# Patient Record
Sex: MALE | Race: WHITE | NOT HISPANIC OR LATINO | Employment: OTHER | URBAN - METROPOLITAN AREA
[De-identification: names, ages, dates, MRNs, and addresses within clinical notes are randomized per-mention and may not be internally consistent; named-entity substitution may affect disease eponyms.]

---

## 2018-03-15 ENCOUNTER — OFFICE VISIT (OUTPATIENT)
Dept: OPHTHALMOLOGY | Facility: CLINIC | Age: 58
End: 2018-03-15
Payer: COMMERCIAL

## 2018-03-15 DIAGNOSIS — H52.03 HYPEROPIA OF BOTH EYES WITH ASTIGMATISM AND PRESBYOPIA: ICD-10-CM

## 2018-03-15 DIAGNOSIS — H52.203 HYPEROPIA OF BOTH EYES WITH ASTIGMATISM AND PRESBYOPIA: ICD-10-CM

## 2018-03-15 DIAGNOSIS — H52.4 HYPEROPIA OF BOTH EYES WITH ASTIGMATISM AND PRESBYOPIA: ICD-10-CM

## 2018-03-15 DIAGNOSIS — E11.9 TYPE 2 DIABETES MELLITUS WITHOUT RETINOPATHY: Primary | ICD-10-CM

## 2018-03-15 PROCEDURE — 92014 COMPRE OPH EXAM EST PT 1/>: CPT | Mod: S$GLB,,, | Performed by: OPTOMETRIST

## 2018-03-15 PROCEDURE — 92015 DETERMINE REFRACTIVE STATE: CPT | Mod: S$GLB,,, | Performed by: OPTOMETRIST

## 2018-03-15 PROCEDURE — 99999 PR PBB SHADOW E&M-EST. PATIENT-LVL I: CPT | Mod: PBBFAC,,, | Performed by: OPTOMETRIST

## 2018-03-15 RX ORDER — METFORMIN HYDROCHLORIDE 500 MG/1
TABLET ORAL
Refills: 5 | COMMUNITY
Start: 2018-02-19 | End: 2018-05-25 | Stop reason: SDUPTHER

## 2018-03-15 RX ORDER — PRAVASTATIN SODIUM 20 MG/1
TABLET ORAL
Refills: 5 | COMMUNITY
Start: 2018-02-19 | End: 2019-11-15 | Stop reason: SDUPTHER

## 2018-03-15 RX ORDER — GLIPIZIDE 10 MG/1
TABLET ORAL
Refills: 2 | COMMUNITY
Start: 2018-02-22 | End: 2018-05-25 | Stop reason: SDUPTHER

## 2018-03-16 PROBLEM — E11.9 TYPE 2 DIABETES MELLITUS WITHOUT RETINOPATHY: Status: ACTIVE | Noted: 2018-03-16

## 2018-03-16 NOTE — PROGRESS NOTES
HPI     PTs last exam was 6/9/16 with DNL. PT c/o overall blurred va and wears   otc readers prn. PT states he got gls after last visit but had a hard time   adjusting to them, states they are lost.   Diabetic eye exam  Diagnosed with diabetes in 2017  Recent vision fluctuations no  Blood sugar: unsure  HPI    Any vision changes since last exam: decreased overall va  Eye pain: no  Other ocular symptoms: no    Do you wear currently wear glasses or contacts? otc readers    Interested in contacts today? no    Do you plan on getting new glasses today? If needed      Last edited by Caral Perry MA on 3/15/2018  3:46 PM. (History)            Assessment /Plan     For exam results, see Encounter Report.    Type 2 diabetes mellitus without retinopathy  No diabetic retinopathy OD, OS  Continue close care with PCP  Monitor 12 months    Hyperopia of both eyes with astigmatism and presbyopia  Eyeglass Final Rx     Eyeglass Final Rx       Sphere Cylinder Axis Dist VA Add    Right +1.00 +0.75 175 20/20 +1.75    Left +0.75 +1.25 014 20/20 +1.75    Expiration Date:  3/16/2019                RTC 1 yr for dilated eye exam or PRN if any problems.   Discussed above and answered questions.

## 2018-05-25 PROBLEM — I15.2 HYPERTENSION ASSOCIATED WITH DIABETES: Status: ACTIVE | Noted: 2018-05-25

## 2018-05-25 PROBLEM — E78.5 HYPERLIPIDEMIA ASSOCIATED WITH TYPE 2 DIABETES MELLITUS: Status: ACTIVE | Noted: 2018-05-25

## 2018-05-25 PROBLEM — E11.59 HYPERTENSION ASSOCIATED WITH DIABETES: Status: ACTIVE | Noted: 2018-05-25

## 2018-05-25 PROBLEM — E11.69 HYPERLIPIDEMIA ASSOCIATED WITH TYPE 2 DIABETES MELLITUS: Status: ACTIVE | Noted: 2018-05-25

## 2018-06-15 PROBLEM — F10.10 ALCOHOL ABUSE: Status: ACTIVE | Noted: 2018-06-15

## 2018-06-25 ENCOUNTER — DOCUMENTATION ONLY (OUTPATIENT)
Dept: ENDOSCOPY | Facility: HOSPITAL | Age: 58
End: 2018-06-25

## 2018-09-21 ENCOUNTER — TELEPHONE (OUTPATIENT)
Dept: UROLOGY | Facility: CLINIC | Age: 58
End: 2018-09-21

## 2018-09-21 NOTE — TELEPHONE ENCOUNTER
----- Message from Yuko Donovan sent at 9/21/2018 10:28 AM CDT -----  Contact: self  Pt is calling in regards to appt acess. Pt is a new pt and would like to see an urologist. The next available appt that I can see is January 2019. Pt is requesting to be seen sooner. Please call pt back at [phone].    Thanks,   Yuko Donovan

## 2019-11-15 ENCOUNTER — OFFICE VISIT (OUTPATIENT)
Dept: OPHTHALMOLOGY | Facility: CLINIC | Age: 59
End: 2019-11-15
Payer: COMMERCIAL

## 2019-11-15 ENCOUNTER — LAB VISIT (OUTPATIENT)
Dept: LAB | Facility: HOSPITAL | Age: 59
End: 2019-11-15
Attending: FAMILY MEDICINE
Payer: COMMERCIAL

## 2019-11-15 ENCOUNTER — OFFICE VISIT (OUTPATIENT)
Dept: FAMILY MEDICINE | Facility: CLINIC | Age: 59
End: 2019-11-15
Payer: COMMERCIAL

## 2019-11-15 VITALS
OXYGEN SATURATION: 97 % | HEIGHT: 69 IN | BODY MASS INDEX: 27.67 KG/M2 | HEART RATE: 83 BPM | WEIGHT: 186.81 LBS | SYSTOLIC BLOOD PRESSURE: 134 MMHG | TEMPERATURE: 99 F | DIASTOLIC BLOOD PRESSURE: 76 MMHG

## 2019-11-15 DIAGNOSIS — E78.5 HYPERLIPIDEMIA ASSOCIATED WITH TYPE 2 DIABETES MELLITUS: ICD-10-CM

## 2019-11-15 DIAGNOSIS — H52.03 HYPEROPIA OF BOTH EYES WITH ASTIGMATISM AND PRESBYOPIA: ICD-10-CM

## 2019-11-15 DIAGNOSIS — M21.41 PES PLANUS OF BOTH FEET: ICD-10-CM

## 2019-11-15 DIAGNOSIS — Z12.5 SCREENING PSA (PROSTATE SPECIFIC ANTIGEN): ICD-10-CM

## 2019-11-15 DIAGNOSIS — H52.203 HYPEROPIA OF BOTH EYES WITH ASTIGMATISM AND PRESBYOPIA: ICD-10-CM

## 2019-11-15 DIAGNOSIS — I15.2 HYPERTENSION ASSOCIATED WITH DIABETES: ICD-10-CM

## 2019-11-15 DIAGNOSIS — M21.42 PES PLANUS OF BOTH FEET: ICD-10-CM

## 2019-11-15 DIAGNOSIS — Z00.01 ANNUAL VISIT FOR GENERAL ADULT MEDICAL EXAMINATION WITH ABNORMAL FINDINGS: ICD-10-CM

## 2019-11-15 DIAGNOSIS — E11.69 HYPERLIPIDEMIA ASSOCIATED WITH TYPE 2 DIABETES MELLITUS: ICD-10-CM

## 2019-11-15 DIAGNOSIS — H52.4 HYPEROPIA OF BOTH EYES WITH ASTIGMATISM AND PRESBYOPIA: ICD-10-CM

## 2019-11-15 DIAGNOSIS — E11.9 TYPE 2 DIABETES MELLITUS WITHOUT RETINOPATHY: Primary | ICD-10-CM

## 2019-11-15 DIAGNOSIS — E11.59 HYPERTENSION ASSOCIATED WITH DIABETES: ICD-10-CM

## 2019-11-15 DIAGNOSIS — Z00.01 ANNUAL VISIT FOR GENERAL ADULT MEDICAL EXAMINATION WITH ABNORMAL FINDINGS: Primary | ICD-10-CM

## 2019-11-15 DIAGNOSIS — Z12.11 COLON CANCER SCREENING: ICD-10-CM

## 2019-11-15 PROCEDURE — 3078F DIAST BP <80 MM HG: CPT | Mod: CPTII,S$GLB,, | Performed by: FAMILY MEDICINE

## 2019-11-15 PROCEDURE — 99999 PR PBB SHADOW E&M-EST. PATIENT-LVL I: CPT | Mod: PBBFAC,,, | Performed by: OPTOMETRIST

## 2019-11-15 PROCEDURE — 92015 PR REFRACTION: ICD-10-PCS | Mod: S$GLB,,, | Performed by: OPTOMETRIST

## 2019-11-15 PROCEDURE — 99386 PR PREVENTIVE VISIT,NEW,40-64: ICD-10-PCS | Mod: S$GLB,,, | Performed by: FAMILY MEDICINE

## 2019-11-15 PROCEDURE — 92015 DETERMINE REFRACTIVE STATE: CPT | Mod: S$GLB,,, | Performed by: OPTOMETRIST

## 2019-11-15 PROCEDURE — 3078F PR MOST RECENT DIASTOLIC BLOOD PRESSURE < 80 MM HG: ICD-10-PCS | Mod: CPTII,S$GLB,, | Performed by: FAMILY MEDICINE

## 2019-11-15 PROCEDURE — 80061 LIPID PANEL: CPT

## 2019-11-15 PROCEDURE — 99386 PREV VISIT NEW AGE 40-64: CPT | Mod: S$GLB,,, | Performed by: FAMILY MEDICINE

## 2019-11-15 PROCEDURE — 92014 COMPRE OPH EXAM EST PT 1/>: CPT | Mod: S$GLB,,, | Performed by: OPTOMETRIST

## 2019-11-15 PROCEDURE — 36415 COLL VENOUS BLD VENIPUNCTURE: CPT | Mod: PO

## 2019-11-15 PROCEDURE — 99999 PR PBB SHADOW E&M-EST. PATIENT-LVL I: ICD-10-PCS | Mod: PBBFAC,,, | Performed by: OPTOMETRIST

## 2019-11-15 PROCEDURE — 83036 HEMOGLOBIN GLYCOSYLATED A1C: CPT

## 2019-11-15 PROCEDURE — 3075F PR MOST RECENT SYSTOLIC BLOOD PRESS GE 130-139MM HG: ICD-10-PCS | Mod: CPTII,S$GLB,, | Performed by: FAMILY MEDICINE

## 2019-11-15 PROCEDURE — 99999 PR PBB SHADOW E&M-EST. PATIENT-LVL III: CPT | Mod: PBBFAC,,, | Performed by: FAMILY MEDICINE

## 2019-11-15 PROCEDURE — 92014 PR EYE EXAM, EST PATIENT,COMPREHESV: ICD-10-PCS | Mod: S$GLB,,, | Performed by: OPTOMETRIST

## 2019-11-15 PROCEDURE — 99999 PR PBB SHADOW E&M-EST. PATIENT-LVL III: ICD-10-PCS | Mod: PBBFAC,,, | Performed by: FAMILY MEDICINE

## 2019-11-15 PROCEDURE — 3075F SYST BP GE 130 - 139MM HG: CPT | Mod: CPTII,S$GLB,, | Performed by: FAMILY MEDICINE

## 2019-11-15 PROCEDURE — 80053 COMPREHEN METABOLIC PANEL: CPT

## 2019-11-15 PROCEDURE — 84153 ASSAY OF PSA TOTAL: CPT

## 2019-11-15 PROCEDURE — 85025 COMPLETE CBC W/AUTO DIFF WBC: CPT

## 2019-11-15 RX ORDER — OLMESARTAN MEDOXOMIL AND HYDROCHLOROTHIAZIDE 20/12.5 20; 12.5 MG/1; MG/1
1 TABLET ORAL DAILY
Qty: 30 TABLET | Refills: 2 | Status: SHIPPED | OUTPATIENT
Start: 2019-11-15 | End: 2020-02-04 | Stop reason: SDUPTHER

## 2019-11-15 RX ORDER — PRAVASTATIN SODIUM 20 MG/1
TABLET ORAL
Qty: 30 TABLET | Refills: 5 | Status: SHIPPED | OUTPATIENT
Start: 2019-11-15

## 2019-11-15 NOTE — PROGRESS NOTES
Subjective:       Patient ID: Mikey Stinson is a 59 y.o. male.    Chief Complaint: Establish Care  And annual visit.      History of Present Illness:   Mikey Stinson 59 y.o. male presents today with establish care and annual visit.  Well Adult Physical: Patient here for a comprehensive physical exam.The patient reports problems - see list below  Do you take any herbs or supplements that were not prescribed by a doctor? no Are you taking calcium supplements? no Are you taking aspirin daily? sometimes   History:  No. Denies history of prostate and colon cancer. Mother  of lung ca from smoking.  He was with is wife who corroborated his story. He is diabetic and has HTN. Supposed to be on metformin, glipizide, actos and lisinopril. He stopped taking glipizide a while back due to hypoglycemia and actos was causing weight gain and he does not want to continue it. He has been out of all his other medications for months and the last time he saw PCP- Dr Menjivar was - wants to change pcp due to cost of lab work and visit.  Blood pressure was normal today  He refused influenza vaccine.  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 No pertinent family history.  Social History     Socioeconomic History    Marital status: Legally      Spouse name: Not on file    Number of children: Not on file    Years of education: Not on file    Highest education level: Not on file   Occupational History    Not on file   Social Needs    Financial resource strain: Not on file    Food insecurity:     Worry: Not on file     Inability: Not on file    Transportation needs:     Medical: Not on file     Non-medical: Not on file   Tobacco Use    Smoking status: Never Smoker    Smokeless tobacco: Never Used   Substance and Sexual Activity    Alcohol use: Yes     Alcohol/week: 20.0 standard drinks     Types:  20 Cans of beer per week    Drug use: Not on file    Sexual activity: Not on file   Lifestyle    Physical activity:     Days per week: Not on file     Minutes per session: Not on file    Stress: Not on file   Relationships    Social connections:     Talks on phone: Not on file     Gets together: Not on file     Attends Advent service: Not on file     Active member of club or organization: Not on file     Attends meetings of clubs or organizations: Not on file     Relationship status: Not on file   Other Topics Concern    Not on file   Social History Narrative    Not on file     Outpatient Encounter Medications as of 11/15/2019   Medication Sig Dispense Refill    pravastatin (PRAVACHOL) 20 MG tablet TK 1 T PO QD 30 tablet 5    tadalafil (CIALIS) 20 MG Tab Use one tablet every 36 hours 10 tablet 11    [DISCONTINUED] lisinopril (PRINIVIL,ZESTRIL) 20 MG tablet TAKE 1 TABLET(20 MG) BY MOUTH EVERY DAY 30 tablet 0    [DISCONTINUED] metFORMIN (GLUCOPHAGE) 500 MG tablet TAKE 2 TABLETS(1000 MG) BY MOUTH TWICE DAILY WITH MEALS 120 tablet 0    [DISCONTINUED] pravastatin (PRAVACHOL) 20 MG tablet TK 1 T PO QD  5    aspirin (ECOTRIN) 81 MG EC tablet Take 1 tablet (81 mg total) by mouth once daily.  0    olmesartan-hydrochlorothiazide (BENICAR HCT) 20-12.5 mg per tablet Take 1 tablet by mouth once daily. 30 tablet 2    SITagliptan-metformin (JANUMET)  mg per tablet Take 1 tablet by mouth 2 (two) times daily with meals. 60 tablet 0    [DISCONTINUED] glipiZIDE (GLUCOTROL) 10 MG tablet Take 1 tablet (10 mg total) by mouth daily with breakfast. 30 tablet 11    [DISCONTINUED] lisinopril (PRINIVIL,ZESTRIL) 20 MG tablet TAKE 1 TABLET(20 MG) BY MOUTH EVERY DAY 30 tablet 0    [DISCONTINUED] metFORMIN (GLUCOPHAGE) 500 MG tablet TAKE 2 TABLETS(1000 MG) BY MOUTH TWICE DAILY WITH MEALS 120 tablet 0    [DISCONTINUED] pioglitazone (ACTOS) 15 MG tablet Take 1 tablet (15 mg total) by mouth once daily. 30 tablet 11     No  "facility-administered encounter medications on file as of 11/15/2019.        Review of Systems   Constitutional: Negative for appetite change and fever.   HENT: Negative for congestion, facial swelling and voice change.    Eyes: Negative for discharge and itching.   Respiratory: Negative for cough, chest tightness and wheezing.    Cardiovascular: Negative.  Negative for chest pain and leg swelling.   Gastrointestinal: Negative for abdominal pain, nausea and vomiting.   Endocrine: Negative for cold intolerance and heat intolerance.   Genitourinary: Negative for dysuria and flank pain.   Musculoskeletal: Negative for myalgias and neck stiffness.   Skin: Negative for pallor and rash.   Neurological: Negative for facial asymmetry and weakness.   Psychiatric/Behavioral: Negative for agitation and confusion.       Objective:      /76 (BP Location: Right arm, Patient Position: Sitting, BP Method: Medium (Automatic))   Pulse 83   Temp 98.5 °F (36.9 °C) (Oral)   Ht 5' 9" (1.753 m)   Wt 84.8 kg (186 lb 13.4 oz)   SpO2 97%   BMI 27.59 kg/m²   Physical Exam   Constitutional: He is oriented to person, place, and time. He appears well-developed. No distress.   HENT:   Head: Normocephalic and atraumatic.   Right Ear: External ear normal.   Left Ear: External ear normal.   Eyes: Conjunctivae and EOM are normal.   Neck: Neck supple.   Cardiovascular: Normal rate and regular rhythm.   Murmur heard.  Pulses:       Dorsalis pedis pulses are 2+ on the right side, and 2+ on the left side.        Posterior tibial pulses are 2+ on the right side, and 2+ on the left side.   Pulmonary/Chest: Effort normal and breath sounds normal. No respiratory distress.   Abdominal: Soft. Normal appearance and bowel sounds are normal. There is no hepatosplenomegaly.   Genitourinary:   Genitourinary Comments: deferred   Musculoskeletal: He exhibits no edema.   Feet:   Right Foot:   Protective Sensation: 10 sites tested. 10 sites sensed.   Skin " Integrity: Positive for callus. Negative for ulcer, blister, skin breakdown, erythema or warmth.   Left Foot:   Protective Sensation: 10 sites tested. 10 sites sensed.   Skin Integrity: Positive for callus. Negative for ulcer, blister or skin breakdown.   Neurological: He is alert and oriented to person, place, and time.   Skin: Skin is warm and dry.   Psychiatric: He has a normal mood and affect. His behavior is normal.   Nursing note and vitals reviewed.        Assessment:       1. Annual visit for general adult medical examination with abnormal findings    2. Hyperlipidemia associated with type 2 diabetes mellitus    3. Hypertension associated with diabetes    4. Screening PSA (prostate specific antigen)    5. Colon cancer screening    6. Pes planus of both feet        Plan:   Today, we will complete labs for monitor and change from acei to combination of arb/thiazide, DC actos and glipizide and replace with janumet. Cont ASA and Pravachol.    Annual visit for general adult medical examination with abnormal findings  -     Lipid panel; Future; Expected date: 11/15/2019    Hyperlipidemia associated with type 2 diabetes mellitus  -     pravastatin (PRAVACHOL) 20 MG tablet; TK 1 T PO QD  Dispense: 30 tablet; Refill: 5  -     Hemoglobin A1c; Future; Expected date: 11/15/2019  -     SITagliptan-metformin (JANUMET)  mg per tablet; Take 1 tablet by mouth 2 (two) times daily with meals.  Dispense: 60 tablet; Refill: 0  -     Comprehensive metabolic panel; Future; Expected date: 11/15/2019  -     CBC auto differential; Future; Expected date: 11/15/2019  -     Microalbumin/creatinine urine ratio; Future; Expected date: 11/15/2019    Hypertension associated with diabetes  -     olmesartan-hydrochlorothiazide (BENICAR HCT) 20-12.5 mg per tablet; Take 1 tablet by mouth once daily.  Dispense: 30 tablet; Refill: 2    Screening PSA (prostate specific antigen)  -     PSA, Screening; Future; Expected date:  11/15/2019    Colon cancer screening  -     Fecal Immunochemical Test (iFOBT); Future; Expected date: 11/15/2019    Pes planus of both feet; on exam

## 2019-11-15 NOTE — PATIENT INSTRUCTIONS

## 2019-11-16 LAB
ALBUMIN SERPL BCP-MCNC: 4.1 G/DL (ref 3.5–5.2)
ALP SERPL-CCNC: 52 U/L (ref 55–135)
ALT SERPL W/O P-5'-P-CCNC: 23 U/L (ref 10–44)
ANION GAP SERPL CALC-SCNC: 11 MMOL/L (ref 8–16)
AST SERPL-CCNC: 21 U/L (ref 10–40)
BASOPHILS # BLD AUTO: 0.05 K/UL (ref 0–0.2)
BASOPHILS NFR BLD: 0.7 % (ref 0–1.9)
BILIRUB SERPL-MCNC: 0.9 MG/DL (ref 0.1–1)
BUN SERPL-MCNC: 15 MG/DL (ref 6–20)
CALCIUM SERPL-MCNC: 10 MG/DL (ref 8.7–10.5)
CHLORIDE SERPL-SCNC: 103 MMOL/L (ref 95–110)
CHOLEST SERPL-MCNC: 194 MG/DL (ref 120–199)
CHOLEST/HDLC SERPL: 5 {RATIO} (ref 2–5)
CO2 SERPL-SCNC: 24 MMOL/L (ref 23–29)
COMPLEXED PSA SERPL-MCNC: 0.69 NG/ML (ref 0–4)
CREAT SERPL-MCNC: 0.9 MG/DL (ref 0.5–1.4)
DIFFERENTIAL METHOD: ABNORMAL
EOSINOPHIL # BLD AUTO: 0.1 K/UL (ref 0–0.5)
EOSINOPHIL NFR BLD: 1.6 % (ref 0–8)
ERYTHROCYTE [DISTWIDTH] IN BLOOD BY AUTOMATED COUNT: 12 % (ref 11.5–14.5)
EST. GFR  (AFRICAN AMERICAN): >60 ML/MIN/1.73 M^2
EST. GFR  (NON AFRICAN AMERICAN): >60 ML/MIN/1.73 M^2
ESTIMATED AVG GLUCOSE: 163 MG/DL (ref 68–131)
GLUCOSE SERPL-MCNC: 231 MG/DL (ref 70–110)
HBA1C MFR BLD HPLC: 7.3 % (ref 4–5.6)
HCT VFR BLD AUTO: 46 % (ref 40–54)
HDLC SERPL-MCNC: 39 MG/DL (ref 40–75)
HDLC SERPL: 20.1 % (ref 20–50)
HGB BLD-MCNC: 15.4 G/DL (ref 14–18)
IMM GRANULOCYTES # BLD AUTO: 0.02 K/UL (ref 0–0.04)
IMM GRANULOCYTES NFR BLD AUTO: 0.3 % (ref 0–0.5)
LDLC SERPL CALC-MCNC: 110.4 MG/DL (ref 63–159)
LYMPHOCYTES # BLD AUTO: 1.3 K/UL (ref 1–4.8)
LYMPHOCYTES NFR BLD: 18.5 % (ref 18–48)
MCH RBC QN AUTO: 31.8 PG (ref 27–31)
MCHC RBC AUTO-ENTMCNC: 33.5 G/DL (ref 32–36)
MCV RBC AUTO: 95 FL (ref 82–98)
MONOCYTES # BLD AUTO: 0.7 K/UL (ref 0.3–1)
MONOCYTES NFR BLD: 9.6 % (ref 4–15)
NEUTROPHILS # BLD AUTO: 4.7 K/UL (ref 1.8–7.7)
NEUTROPHILS NFR BLD: 69.3 % (ref 38–73)
NONHDLC SERPL-MCNC: 155 MG/DL
NRBC BLD-RTO: 0 /100 WBC
PLATELET # BLD AUTO: 233 K/UL (ref 150–350)
PMV BLD AUTO: 11.4 FL (ref 9.2–12.9)
POTASSIUM SERPL-SCNC: 4.1 MMOL/L (ref 3.5–5.1)
PROT SERPL-MCNC: 7.4 G/DL (ref 6–8.4)
RBC # BLD AUTO: 4.84 M/UL (ref 4.6–6.2)
SODIUM SERPL-SCNC: 138 MMOL/L (ref 136–145)
TRIGL SERPL-MCNC: 223 MG/DL (ref 30–150)
WBC # BLD AUTO: 6.8 K/UL (ref 3.9–12.7)

## 2019-11-18 ENCOUNTER — APPOINTMENT (OUTPATIENT)
Dept: LAB | Facility: HOSPITAL | Age: 59
End: 2019-11-18
Attending: FAMILY MEDICINE
Payer: COMMERCIAL

## 2019-11-18 ENCOUNTER — TELEPHONE (OUTPATIENT)
Dept: FAMILY MEDICINE | Facility: CLINIC | Age: 59
End: 2019-11-18

## 2019-11-18 DIAGNOSIS — E11.65 CONTROLLED TYPE 2 DIABETES MELLITUS WITH HYPERGLYCEMIA, WITHOUT LONG-TERM CURRENT USE OF INSULIN: Primary | ICD-10-CM

## 2019-11-18 RX ORDER — METFORMIN HYDROCHLORIDE 500 MG/1
500 TABLET ORAL 2 TIMES DAILY WITH MEALS
Qty: 180 TABLET | Refills: 0 | Status: SHIPPED | OUTPATIENT
Start: 2019-11-18 | End: 2020-11-17

## 2019-11-18 NOTE — TELEPHONE ENCOUNTER
----- Message from Bruce Jarvis sent at 11/18/2019 10:55 AM CST -----  Insurance will not cover Janumet (sitagliptan-metformin).  Can it be changed to what the insurance will cover for that medication.  The family said that you can call it to the pharmacy.  I told them it would be for tomorrow.     Thank you,  Bruce

## 2019-11-20 ENCOUNTER — TELEPHONE (OUTPATIENT)
Dept: DERMATOLOGY | Facility: CLINIC | Age: 59
End: 2019-11-20

## 2019-11-20 ENCOUNTER — OFFICE VISIT (OUTPATIENT)
Dept: DERMATOLOGY | Facility: CLINIC | Age: 59
End: 2019-11-20
Payer: COMMERCIAL

## 2019-11-20 DIAGNOSIS — L82.0 INFLAMED SEBORRHEIC KERATOSIS: Primary | ICD-10-CM

## 2019-11-20 PROCEDURE — 99202 PR OFFICE/OUTPT VISIT, NEW, LEVL II, 15-29 MIN: ICD-10-PCS | Mod: 25,S$GLB,, | Performed by: STUDENT IN AN ORGANIZED HEALTH CARE EDUCATION/TRAINING PROGRAM

## 2019-11-20 PROCEDURE — 99999 PR PBB SHADOW E&M-EST. PATIENT-LVL II: ICD-10-PCS | Mod: PBBFAC,,, | Performed by: STUDENT IN AN ORGANIZED HEALTH CARE EDUCATION/TRAINING PROGRAM

## 2019-11-20 PROCEDURE — 17110 DESTRUCTION B9 LES UP TO 14: CPT | Mod: S$GLB,,, | Performed by: STUDENT IN AN ORGANIZED HEALTH CARE EDUCATION/TRAINING PROGRAM

## 2019-11-20 PROCEDURE — 17110 PR DESTRUCTION BENIGN LESIONS UP TO 14: ICD-10-PCS | Mod: S$GLB,,, | Performed by: STUDENT IN AN ORGANIZED HEALTH CARE EDUCATION/TRAINING PROGRAM

## 2019-11-20 PROCEDURE — 99202 OFFICE O/P NEW SF 15 MIN: CPT | Mod: 25,S$GLB,, | Performed by: STUDENT IN AN ORGANIZED HEALTH CARE EDUCATION/TRAINING PROGRAM

## 2019-11-20 PROCEDURE — 99999 PR PBB SHADOW E&M-EST. PATIENT-LVL II: CPT | Mod: PBBFAC,,, | Performed by: STUDENT IN AN ORGANIZED HEALTH CARE EDUCATION/TRAINING PROGRAM

## 2019-11-20 NOTE — TELEPHONE ENCOUNTER
Spoke to pt. Pt stated he had a missed call from [facility]. I informed him that it was not anyone from clinic had called. And there was no telephone encounter so I was unsure of who called. Pt stated that was fine. ----- Message from Sae Nicholas sent at 11/20/2019  4:41 PM CST -----  Contact: PT   Type:  Patient Returning Call    Who Called:Pt   Who Left Message for Patient: unknown   Does the patient know what this is regarding?:yes   Would the patient rather a call back or a response via [facility]? Call back   Best Call Back Number: [phone] (home)   Additional Information: n/a

## 2019-11-20 NOTE — PROGRESS NOTES
Subjective:       Patient ID:  Mikey Stinson is a 59 y.o. male who presents for   Chief Complaint   Patient presents with    Spot     on face and leg. Face for 3-4 months and lef gor 1 month. Both burn and hurt     History of Present Illness: The patient presents with chief complaint of a painful skin lesion.  Location: left cheek  Duration: present for months, recently began to change  Signs/Symptoms: painful and irritated  Prior treatments: none  Denies any history of skin cancer.        Review of Systems   Skin: Negative for itching, rash, dry skin, daily sunscreen use and activity-related sunscreen use.        Objective:    Physical Exam   Constitutional: He appears well-developed and well-nourished. No distress.   Neurological: He is alert and oriented to person, place, and time. He is not disoriented.   Psychiatric: He has a normal mood and affect.   Skin:   Areas Examined (abnormalities noted in diagram):   Head / Face Inspection Performed  Neck Inspection Performed  Chest / Axilla Inspection Performed  Back Inspection Performed  RUE Inspected  LUE Inspection Performed                   Diagram Legend     Erythematous scaling macule/papule c/w actinic keratosis       Vascular papule c/w angioma      Pigmented verrucoid papule/plaque c/w seborrheic keratosis      Yellow umbilicated papule c/w sebaceous hyperplasia      Irregularly shaped tan macule c/w lentigo     1-2 mm smooth white papules consistent with Milia      Movable subcutaneous cyst with punctum c/w epidermal inclusion cyst      Subcutaneous movable cyst c/w pilar cyst      Firm pink to brown papule c/w dermatofibroma      Pedunculated fleshy papule(s) c/w skin tag(s)      Evenly pigmented macule c/w junctional nevus     Mildly variegated pigmented, slightly irregular-bordered macule c/w mildly atypical nevus      Flesh colored to evenly pigmented papule c/w intradermal nevus       Pink pearly papule/plaque c/w basal cell carcinoma       Erythematous hyperkeratotic cursted plaque c/w SCC      Surgical scar with no sign of skin cancer recurrence      Open and closed comedones      Inflammatory papules and pustules      Verrucoid papule consistent consistent with wart     Erythematous eczematous patches and plaques     Dystrophic onycholytic nail with subungual debris c/w onychomycosis     Umbilicated papule    Erythematous-base heme-crusted tan verrucoid plaque consistent with inflamed seborrheic keratosis     Erythematous Silvery Scaling Plaque c/w Psoriasis     See annotation      Assessment / Plan:        Inflamed seborrheic keratosis  Cryosurgery procedure note:    Verbal consent from the patient is obtained including, but not limited to, risk of hypopigmentation/hyperpigmentation, scar, recurrence of lesion. Liquid nitrogen cryosurgery is applied to 1 lesions to produce a freeze injury. The patient is aware that blisters may form and is instructed on wound care with gentle cleansing and use of vaseline ointment to keep moist until healed. The patient is supplied a handout on cryosurgery and is instructed to call if lesions do not completely resolve.    If no improvement, low threshold to biopsy         Follow up in about 1 year (around 11/20/2020).

## 2020-02-04 DIAGNOSIS — E11.59 HYPERTENSION ASSOCIATED WITH DIABETES: ICD-10-CM

## 2020-02-04 DIAGNOSIS — I15.2 HYPERTENSION ASSOCIATED WITH DIABETES: ICD-10-CM

## 2020-02-04 RX ORDER — OLMESARTAN MEDOXOMIL AND HYDROCHLOROTHIAZIDE 20/12.5 20; 12.5 MG/1; MG/1
1 TABLET ORAL DAILY
Qty: 30 TABLET | Refills: 2 | Status: SHIPPED | OUTPATIENT
Start: 2020-02-04 | End: 2021-02-03

## 2020-02-21 ENCOUNTER — TELEPHONE (OUTPATIENT)
Dept: FAMILY MEDICINE | Facility: CLINIC | Age: 60
End: 2020-02-21

## 2020-02-21 NOTE — TELEPHONE ENCOUNTER
Attempted to contact patient to see if he would be able to make his 3:20 appointment today. No answer, aubrey

## 2021-08-03 ENCOUNTER — IMMUNIZATION (OUTPATIENT)
Dept: INTERNAL MEDICINE | Facility: CLINIC | Age: 61
End: 2021-08-03
Payer: COMMERCIAL

## 2021-08-03 DIAGNOSIS — Z23 NEED FOR VACCINATION: Primary | ICD-10-CM

## 2021-08-03 PROCEDURE — 91300 COVID-19, MRNA, LNP-S, PF, 30 MCG/0.3 ML DOSE VACCINE: ICD-10-PCS | Mod: ,,, | Performed by: FAMILY MEDICINE

## 2021-08-03 PROCEDURE — 91300 COVID-19, MRNA, LNP-S, PF, 30 MCG/0.3 ML DOSE VACCINE: CPT | Mod: ,,, | Performed by: FAMILY MEDICINE

## 2021-08-03 PROCEDURE — 0001A COVID-19, MRNA, LNP-S, PF, 30 MCG/0.3 ML DOSE VACCINE: CPT | Mod: CV19,,, | Performed by: FAMILY MEDICINE

## 2021-08-03 PROCEDURE — 0001A COVID-19, MRNA, LNP-S, PF, 30 MCG/0.3 ML DOSE VACCINE: ICD-10-PCS | Mod: CV19,,, | Performed by: FAMILY MEDICINE

## 2022-07-05 ENCOUNTER — OFFICE VISIT (OUTPATIENT)
Dept: URGENT CARE | Facility: CLINIC | Age: 62
End: 2022-07-05
Payer: COMMERCIAL

## 2022-07-05 VITALS
DIASTOLIC BLOOD PRESSURE: 67 MMHG | HEIGHT: 69 IN | HEART RATE: 90 BPM | TEMPERATURE: 98 F | OXYGEN SATURATION: 97 % | WEIGHT: 182.63 LBS | SYSTOLIC BLOOD PRESSURE: 111 MMHG | RESPIRATION RATE: 18 BRPM | BODY MASS INDEX: 27.05 KG/M2

## 2022-07-05 DIAGNOSIS — J06.9 URI WITH COUGH AND CONGESTION: Primary | ICD-10-CM

## 2022-07-05 LAB
CTP QC/QA: YES
SARS-COV-2 RDRP RESP QL NAA+PROBE: NEGATIVE

## 2022-07-05 PROCEDURE — 3008F BODY MASS INDEX DOCD: CPT | Mod: CPTII,S$GLB,, | Performed by: NURSE PRACTITIONER

## 2022-07-05 PROCEDURE — 1159F PR MEDICATION LIST DOCUMENTED IN MEDICAL RECORD: ICD-10-PCS | Mod: CPTII,S$GLB,, | Performed by: NURSE PRACTITIONER

## 2022-07-05 PROCEDURE — 99213 OFFICE O/P EST LOW 20 MIN: CPT | Mod: S$GLB,,, | Performed by: NURSE PRACTITIONER

## 2022-07-05 PROCEDURE — 3074F PR MOST RECENT SYSTOLIC BLOOD PRESSURE < 130 MM HG: ICD-10-PCS | Mod: CPTII,S$GLB,, | Performed by: NURSE PRACTITIONER

## 2022-07-05 PROCEDURE — 3078F DIAST BP <80 MM HG: CPT | Mod: CPTII,S$GLB,, | Performed by: NURSE PRACTITIONER

## 2022-07-05 PROCEDURE — 1160F RVW MEDS BY RX/DR IN RCRD: CPT | Mod: CPTII,S$GLB,, | Performed by: NURSE PRACTITIONER

## 2022-07-05 PROCEDURE — U0002: ICD-10-PCS | Mod: QW,S$GLB,, | Performed by: NURSE PRACTITIONER

## 2022-07-05 PROCEDURE — U0002 COVID-19 LAB TEST NON-CDC: HCPCS | Mod: QW,S$GLB,, | Performed by: NURSE PRACTITIONER

## 2022-07-05 PROCEDURE — 3074F SYST BP LT 130 MM HG: CPT | Mod: CPTII,S$GLB,, | Performed by: NURSE PRACTITIONER

## 2022-07-05 PROCEDURE — 1159F MED LIST DOCD IN RCRD: CPT | Mod: CPTII,S$GLB,, | Performed by: NURSE PRACTITIONER

## 2022-07-05 PROCEDURE — 99213 PR OFFICE/OUTPT VISIT, EST, LEVL III, 20-29 MIN: ICD-10-PCS | Mod: S$GLB,,, | Performed by: NURSE PRACTITIONER

## 2022-07-05 PROCEDURE — 3078F PR MOST RECENT DIASTOLIC BLOOD PRESSURE < 80 MM HG: ICD-10-PCS | Mod: CPTII,S$GLB,, | Performed by: NURSE PRACTITIONER

## 2022-07-05 PROCEDURE — 3008F PR BODY MASS INDEX (BMI) DOCUMENTED: ICD-10-PCS | Mod: CPTII,S$GLB,, | Performed by: NURSE PRACTITIONER

## 2022-07-05 PROCEDURE — 1160F PR REVIEW ALL MEDS BY PRESCRIBER/CLIN PHARMACIST DOCUMENTED: ICD-10-PCS | Mod: CPTII,S$GLB,, | Performed by: NURSE PRACTITIONER

## 2022-07-05 NOTE — PROGRESS NOTES
"Subjective:       Patient ID: Mikey Stinson is a 61 y.o. male.    Vitals:  height is 5' 9" (1.753 m) and weight is 82.9 kg (182 lb 10.4 oz). His tympanic temperature is 98 °F (36.7 °C). His blood pressure is 111/67 and his pulse is 90. His respiration is 18 and oxygen saturation is 97%.     Chief Complaint: Sinus Problem    Patient presents with cough, fatigue, headache, chest discomfort.  Symptoms started last night 5pm.    Sinus Problem  This is a new problem. The current episode started yesterday. The problem is unchanged. There has been no fever. Associated symptoms include congestion, coughing, headaches, a hoarse voice, neck pain and shortness of breath. Pertinent negatives include no chills, diaphoresis, ear pain, sinus pressure, sneezing, sore throat or swollen glands. Past treatments include nothing.       Constitution: Negative for chills and sweating.   HENT: Positive for congestion. Negative for ear pain, sinus pressure and sore throat.    Neck: Positive for neck pain.   Respiratory: Positive for cough and shortness of breath.    Allergic/Immunologic: Negative for sneezing.   Neurological: Positive for headaches.       Objective:      Physical Exam   Constitutional: He is oriented to person, place, and time. He appears well-developed. He is cooperative.  Non-toxic appearance. He does not appear ill. No distress.   HENT:   Head: Normocephalic and atraumatic.   Ears:   Right Ear: Hearing and external ear normal.   Left Ear: Hearing and external ear normal.   Nose: Nose normal. No mucosal edema, rhinorrhea or nasal deformity. No epistaxis. Right sinus exhibits no maxillary sinus tenderness and no frontal sinus tenderness. Left sinus exhibits no maxillary sinus tenderness and no frontal sinus tenderness.   Mouth/Throat: Uvula is midline and mucous membranes are normal. No trismus in the jaw. Normal dentition. No uvula swelling. Cobblestoning present. No oropharyngeal exudate, posterior oropharyngeal edema or " posterior oropharyngeal erythema.   Eyes: Conjunctivae and lids are normal. No scleral icterus.   Neck: Trachea normal and phonation normal. Neck supple. No edema present. No erythema present. No neck rigidity present.   Cardiovascular: Normal rate, regular rhythm, normal heart sounds and normal pulses.   Pulmonary/Chest: Effort normal and breath sounds normal. No respiratory distress. He has no decreased breath sounds. He has no wheezes. He has no rhonchi.   Abdominal: Normal appearance.   Musculoskeletal: Normal range of motion.         General: No deformity. Normal range of motion.   Neurological: He is alert and oriented to person, place, and time. He exhibits normal muscle tone. Coordination normal.   Skin: Skin is warm, dry, intact, not diaphoretic and not pale.   Psychiatric: His speech is normal and behavior is normal. Judgment and thought content normal.   Nursing note and vitals reviewed.        Assessment:       1. URI with cough and congestion          Plan:         URI with cough and congestion  -     POCT COVID-19 Rapid Screening                 Results for orders placed or performed in visit on 07/05/22   POCT COVID-19 Rapid Screening   Result Value Ref Range    POC Rapid COVID Negative Negative     Acceptable Yes      Lab result reviewed and discussed with patient.    · Get plenty of rest.  · Increase fluids.   · May apply warm compresses as needed for facial pain and congestion.   · Saline nasal spray to loosen nasal congestion.  · Humidifier or steamy shower may help with congestion.  · Flonase or Nasacort to reduce inflammation in the sinus cavities.  · You may take an over the counter 24 hour antihistamine such as Zyrtec or Claritin for allergy symptoms such as sneezing, itchy/watery eyes, scratchy throat, or congestion.  · Warm salt water gargles, Cepacol throat lozenges, or Chloraseptic spray for sore throat.  · Take Tylenol or Ibuprofen as needed for sore throat, body aches, or  fever.  · Take an over the counter cough suppressant such as Delsym or Robitussin DM as directed on label for cough.  · Follow up with your primary care provider if symptoms persist >10 days or sooner for any new or worsening symptoms.   · Go to the ER for any fever that does not improve with Tylenol/Ibuprofen, neck stiffness, rash, severe headache, vision changes, shortness of breath, chest pain, facial swelling, severe facial pain, or any other new and concerning symptoms.

## 2022-07-05 NOTE — PATIENT INSTRUCTIONS
Get plenty of rest.  Increase fluids.   May apply warm compresses as needed for facial pain and congestion.   Saline nasal spray to loosen nasal congestion.  Humidifier or steamy shower may help with congestion.  Flonase or Nasacort to reduce inflammation in the sinus cavities.  You may take an over the counter 24 hour antihistamine such as Zyrtec or Claritin for allergy symptoms such as sneezing, itchy/watery eyes, scratchy throat, or congestion.  Warm salt water gargles, Cepacol throat lozenges, or Chloraseptic spray for sore throat.  Take Tylenol or Ibuprofen as needed for sore throat, body aches, or fever.  Take an over the counter cough suppressant such as Delsym or Robitussin DM as directed on label for cough.  Follow up with your primary care provider if symptoms persist >10 days or sooner for any new or worsening symptoms.   Go to the ER for any fever that does not improve with Tylenol/Ibuprofen, neck stiffness, rash, severe headache, vision changes, shortness of breath, chest pain, facial swelling, severe facial pain, or any other new and concerning symptoms.

## 2024-04-19 ENCOUNTER — OFFICE VISIT (OUTPATIENT)
Dept: INTERNAL MEDICINE | Facility: CLINIC | Age: 64
End: 2024-04-19
Payer: COMMERCIAL

## 2024-04-19 VITALS
HEART RATE: 73 BPM | DIASTOLIC BLOOD PRESSURE: 82 MMHG | WEIGHT: 175.5 LBS | OXYGEN SATURATION: 98 % | BODY MASS INDEX: 26 KG/M2 | TEMPERATURE: 98 F | RESPIRATION RATE: 17 BRPM | HEIGHT: 69 IN | SYSTOLIC BLOOD PRESSURE: 139 MMHG

## 2024-04-19 DIAGNOSIS — R63.4 UNINTENDED WEIGHT LOSS: ICD-10-CM

## 2024-04-19 DIAGNOSIS — Z00.00 ROUTINE GENERAL MEDICAL EXAMINATION AT A HEALTH CARE FACILITY: Primary | ICD-10-CM

## 2024-04-19 DIAGNOSIS — Z12.11 COLON CANCER SCREENING: ICD-10-CM

## 2024-04-19 DIAGNOSIS — E11.69 HYPERLIPIDEMIA ASSOCIATED WITH TYPE 2 DIABETES MELLITUS: ICD-10-CM

## 2024-04-19 DIAGNOSIS — E78.5 HYPERLIPIDEMIA ASSOCIATED WITH TYPE 2 DIABETES MELLITUS: ICD-10-CM

## 2024-04-19 DIAGNOSIS — F10.10 ALCOHOL ABUSE: ICD-10-CM

## 2024-04-19 DIAGNOSIS — I15.2 HYPERTENSION ASSOCIATED WITH DIABETES: ICD-10-CM

## 2024-04-19 DIAGNOSIS — E11.9 TYPE 2 DIABETES MELLITUS WITHOUT RETINOPATHY: ICD-10-CM

## 2024-04-19 DIAGNOSIS — E11.59 HYPERTENSION ASSOCIATED WITH DIABETES: ICD-10-CM

## 2024-04-19 LAB
ALBUMIN SERPL BCP-MCNC: 4.5 G/DL (ref 3.5–5.2)
ALBUMIN/CREAT UR: 75.3 UG/MG (ref 0–30)
ALP SERPL-CCNC: 86 U/L (ref 55–135)
ALT SERPL W/O P-5'-P-CCNC: 14 U/L (ref 10–44)
ANION GAP SERPL CALC-SCNC: 13 MMOL/L (ref 8–16)
AST SERPL-CCNC: 15 U/L (ref 10–40)
BASOPHILS # BLD AUTO: 0.05 K/UL (ref 0–0.2)
BASOPHILS NFR BLD: 1.1 % (ref 0–1.9)
BILIRUB SERPL-MCNC: 1 MG/DL (ref 0.1–1)
BUN SERPL-MCNC: 13 MG/DL (ref 8–23)
CALCIUM SERPL-MCNC: 10.1 MG/DL (ref 8.7–10.5)
CHLORIDE SERPL-SCNC: 99 MMOL/L (ref 95–110)
CHOLEST SERPL-MCNC: 218 MG/DL (ref 120–199)
CHOLEST/HDLC SERPL: 4.8 {RATIO} (ref 2–5)
CO2 SERPL-SCNC: 25 MMOL/L (ref 23–29)
COMPLEXED PSA SERPL-MCNC: 1.3 NG/ML (ref 0–4)
CREAT SERPL-MCNC: 1.1 MG/DL (ref 0.5–1.4)
CREAT UR-MCNC: 73 MG/DL (ref 23–375)
DIFFERENTIAL METHOD BLD: NORMAL
EOSINOPHIL # BLD AUTO: 0.1 K/UL (ref 0–0.5)
EOSINOPHIL NFR BLD: 1.3 % (ref 0–8)
ERYTHROCYTE [DISTWIDTH] IN BLOOD BY AUTOMATED COUNT: 13.1 % (ref 11.5–14.5)
EST. GFR  (NO RACE VARIABLE): >60 ML/MIN/1.73 M^2
ESTIMATED AVG GLUCOSE: 309 MG/DL (ref 68–131)
GLUCOSE SERPL-MCNC: 268 MG/DL (ref 70–110)
HBA1C MFR BLD: 12.4 % (ref 4–5.6)
HCT VFR BLD AUTO: 50.8 % (ref 40–54)
HDLC SERPL-MCNC: 45 MG/DL (ref 40–75)
HDLC SERPL: 20.6 % (ref 20–50)
HGB BLD-MCNC: 16.9 G/DL (ref 14–18)
HIV 1+2 AB+HIV1 P24 AG SERPL QL IA: NORMAL
IMM GRANULOCYTES # BLD AUTO: 0.02 K/UL (ref 0–0.04)
IMM GRANULOCYTES NFR BLD AUTO: 0.4 % (ref 0–0.5)
LDLC SERPL CALC-MCNC: 126.8 MG/DL (ref 63–159)
LYMPHOCYTES # BLD AUTO: 1 K/UL (ref 1–4.8)
LYMPHOCYTES NFR BLD: 23.3 % (ref 18–48)
MCH RBC QN AUTO: 30.7 PG (ref 27–31)
MCHC RBC AUTO-ENTMCNC: 33.3 G/DL (ref 32–36)
MCV RBC AUTO: 92 FL (ref 82–98)
MICROALBUMIN UR DL<=1MG/L-MCNC: 55 UG/ML
MONOCYTES # BLD AUTO: 0.4 K/UL (ref 0.3–1)
MONOCYTES NFR BLD: 8.1 % (ref 4–15)
NEUTROPHILS # BLD AUTO: 2.9 K/UL (ref 1.8–7.7)
NEUTROPHILS NFR BLD: 65.8 % (ref 38–73)
NONHDLC SERPL-MCNC: 173 MG/DL
NRBC BLD-RTO: 0 /100 WBC
PLATELET # BLD AUTO: 220 K/UL (ref 150–450)
PMV BLD AUTO: 11.5 FL (ref 9.2–12.9)
POTASSIUM SERPL-SCNC: 4.6 MMOL/L (ref 3.5–5.1)
PROT SERPL-MCNC: 7.5 G/DL (ref 6–8.4)
RBC # BLD AUTO: 5.5 M/UL (ref 4.6–6.2)
SODIUM SERPL-SCNC: 137 MMOL/L (ref 136–145)
TRIGL SERPL-MCNC: 231 MG/DL (ref 30–150)
TSH SERPL DL<=0.005 MIU/L-ACNC: 0.97 UIU/ML (ref 0.4–4)
WBC # BLD AUTO: 4.46 K/UL (ref 3.9–12.7)

## 2024-04-19 PROCEDURE — 3079F DIAST BP 80-89 MM HG: CPT | Mod: CPTII,S$GLB,, | Performed by: FAMILY MEDICINE

## 2024-04-19 PROCEDURE — 80061 LIPID PANEL: CPT | Performed by: FAMILY MEDICINE

## 2024-04-19 PROCEDURE — 84153 ASSAY OF PSA TOTAL: CPT | Performed by: FAMILY MEDICINE

## 2024-04-19 PROCEDURE — 99999 PR PBB SHADOW E&M-EST. PATIENT-LVL IV: CPT | Mod: PBBFAC,,, | Performed by: FAMILY MEDICINE

## 2024-04-19 PROCEDURE — 1159F MED LIST DOCD IN RCRD: CPT | Mod: CPTII,S$GLB,, | Performed by: FAMILY MEDICINE

## 2024-04-19 PROCEDURE — 85025 COMPLETE CBC W/AUTO DIFF WBC: CPT | Performed by: FAMILY MEDICINE

## 2024-04-19 PROCEDURE — 99203 OFFICE O/P NEW LOW 30 MIN: CPT | Mod: 25,S$GLB,, | Performed by: FAMILY MEDICINE

## 2024-04-19 PROCEDURE — 80053 COMPREHEN METABOLIC PANEL: CPT | Performed by: FAMILY MEDICINE

## 2024-04-19 PROCEDURE — 1160F RVW MEDS BY RX/DR IN RCRD: CPT | Mod: CPTII,S$GLB,, | Performed by: FAMILY MEDICINE

## 2024-04-19 PROCEDURE — 82043 UR ALBUMIN QUANTITATIVE: CPT | Performed by: FAMILY MEDICINE

## 2024-04-19 PROCEDURE — 3075F SYST BP GE 130 - 139MM HG: CPT | Mod: CPTII,S$GLB,, | Performed by: FAMILY MEDICINE

## 2024-04-19 PROCEDURE — 83036 HEMOGLOBIN GLYCOSYLATED A1C: CPT | Performed by: FAMILY MEDICINE

## 2024-04-19 PROCEDURE — 87389 HIV-1 AG W/HIV-1&-2 AB AG IA: CPT | Performed by: FAMILY MEDICINE

## 2024-04-19 PROCEDURE — 4010F ACE/ARB THERAPY RXD/TAKEN: CPT | Mod: CPTII,S$GLB,, | Performed by: FAMILY MEDICINE

## 2024-04-19 PROCEDURE — 3008F BODY MASS INDEX DOCD: CPT | Mod: CPTII,S$GLB,, | Performed by: FAMILY MEDICINE

## 2024-04-19 PROCEDURE — 99386 PREV VISIT NEW AGE 40-64: CPT | Mod: S$GLB,,, | Performed by: FAMILY MEDICINE

## 2024-04-19 PROCEDURE — 84443 ASSAY THYROID STIM HORMONE: CPT | Performed by: FAMILY MEDICINE

## 2024-04-19 RX ORDER — METFORMIN HYDROCHLORIDE 1000 MG/1
1000 TABLET ORAL 2 TIMES DAILY
COMMUNITY
Start: 2023-10-25 | End: 2024-04-19 | Stop reason: SDUPTHER

## 2024-04-19 RX ORDER — ROSUVASTATIN CALCIUM 20 MG/1
20 TABLET, COATED ORAL NIGHTLY
Qty: 90 TABLET | Refills: 1 | Status: SHIPPED | OUTPATIENT
Start: 2024-04-19 | End: 2025-04-19

## 2024-04-19 RX ORDER — OLMESARTAN MEDOXOMIL 20 MG/1
20 TABLET ORAL DAILY
Qty: 90 TABLET | Refills: 1 | Status: SHIPPED | OUTPATIENT
Start: 2024-04-19 | End: 2025-04-19

## 2024-04-19 RX ORDER — METFORMIN HYDROCHLORIDE 1000 MG/1
1000 TABLET ORAL 2 TIMES DAILY WITH MEALS
Qty: 180 TABLET | Refills: 1 | Status: SHIPPED | OUTPATIENT
Start: 2024-04-19

## 2024-04-19 NOTE — PROGRESS NOTES
"Subjective:       Patient ID: Mikey Stinson is a 63 y.o. male.    Chief Complaint: Establish Care    63-year-old male patient with Patient Active Problem List:     Type 2 diabetes mellitus without retinopathy     Hypertension associated with diabetes     Hyperlipidemia associated with type 2 diabetes mellitus     Alcohol abuse  New to my clinic here to establish care  Patient has been taking metformin 1000 mg twice daily but has not been monitoring his blood glucose levels regularly, 2 weeks ago his blood glucose level was 285  Has been out of his blood pressure and cholesterol medication  Drinks 6-8 beers daily  Reports weight loss of 15 lb in the past 2 and half months unintentionally, appetite has been stable  Has been waking up several times at nighttime to use the restroom  Denies of any chest pain or difficulty breathing with palpitations or dizziness      Review of Systems   Constitutional:  Positive for unexpected weight change. Negative for activity change, appetite change and fatigue.   Eyes:  Negative for visual disturbance.   Respiratory:  Negative for shortness of breath.    Cardiovascular:  Negative for chest pain, palpitations and leg swelling.   Gastrointestinal:  Negative for abdominal pain, nausea and vomiting.   Endocrine: Negative for polydipsia and polyuria.   Genitourinary:  Positive for frequency. Negative for dysuria and urgency.   Musculoskeletal:  Negative for myalgias.   Skin:  Negative for rash.   Neurological:  Negative for weakness, numbness and headaches.   Psychiatric/Behavioral:  Negative for sleep disturbance.          /82 (BP Location: Right arm, Patient Position: Sitting, BP Method: Medium (Manual))   Pulse 73   Temp 98.4 °F (36.9 °C) (Tympanic)   Resp 17   Ht 5' 9" (1.753 m)   Wt 79.6 kg (175 lb 7.8 oz)   SpO2 98%   BMI 25.91 kg/m²   Objective:      Physical Exam  Constitutional:       Appearance: He is well-developed.   HENT:      Head: Normocephalic and atraumatic. "   Cardiovascular:      Rate and Rhythm: Normal rate and regular rhythm.      Heart sounds: Normal heart sounds. No murmur heard.  Pulmonary:      Effort: Pulmonary effort is normal.      Breath sounds: Normal breath sounds. No wheezing.   Abdominal:      General: Bowel sounds are normal.      Palpations: Abdomen is soft.      Tenderness: There is no abdominal tenderness.   Skin:     General: Skin is warm and dry.      Findings: No rash.   Neurological:      Mental Status: He is alert and oriented to person, place, and time.   Psychiatric:         Mood and Affect: Mood normal.           Assessment/Plan:   1. Routine general medical examination at a health care facility  -     Urinalysis, Reflex to Urine Culture Urine, Clean Catch; Future; Expected date: 04/19/2024  -     Hemoglobin A1C; Future; Expected date: 04/19/2024  -     TSH; Future; Expected date: 04/19/2024  -     Lipid Panel; Future; Expected date: 04/19/2024  -     Comprehensive Metabolic Panel; Future; Expected date: 04/19/2024  -     CBC Auto Differential; Future; Expected date: 04/19/2024  -     PSA, Screening; Future; Expected date: 04/19/2024  -     HIV 1/2 Ag/Ab (4th Gen); Future; Expected date: 04/19/2024  Vital signs stable today.  Clinical exam stable  Continue lifestyle modifications with low-fat and low-cholesterol diet and exercise 30 minutes daily  Due for shingles RSV vaccination, encouraged to consider getting it at outside pharmacy    2. Hypertension associated with diabetes  -     olmesartan (BENICAR) 20 MG tablet; Take 1 tablet (20 mg total) by mouth once daily.  Dispense: 90 tablet; Refill: 1  -     Urinalysis, Reflex to Urine Culture Urine, Clean Catch; Future; Expected date: 04/19/2024  -     TSH; Future; Expected date: 04/19/2024  -     Lipid Panel; Future; Expected date: 04/19/2024  -     Comprehensive Metabolic Panel; Future; Expected date: 04/19/2024  Blood pressure mildly elevated but stable, previously was taking Benicar  hydrochlorothiazide 20/12.5 mg, will change it to Benicar 20 mg daily    3. Hyperlipidemia associated with type 2 diabetes mellitus  -     rosuvastatin (CRESTOR) 20 MG tablet; Take 1 tablet (20 mg total) by mouth every evening.  Dispense: 90 tablet; Refill: 1  -     Lipid Panel; Future; Expected date: 04/19/2024  Previously was on pravastatin 20 mg but has not been taking it for a long time, will change it to Crestor 20 mg and will send it to the pharmacy    4. Type 2 diabetes mellitus without retinopathy  -     metFORMIN (GLUCOPHAGE) 1000 MG tablet; Take 1 tablet (1,000 mg total) by mouth 2 (two) times daily with meals.  Dispense: 180 tablet; Refill: 1  -     Hemoglobin A1C; Future; Expected date: 04/19/2024  -     Microalbumin/Creatinine Ratio, Urine; Future; Expected date: 04/19/2024  Currently taking metformin 1000 mg twice daily  Encouraged to monitor blood glucose levels regularly  Not interested in taking insulin  Strict lifestyle changes recommended with 1800 ADA low-fat and low-cholesterol diet and exercise 30 minutes daily      5. Alcohol abuse  Encouraged to quit alcohol intake    6. Colon cancer screening  7. Unintended weight loss  -     Ambulatory referral/consult to Endo Procedure ; Future; Expected date: 04/20/2024  Due for colon cancer screening    Encouraged to eat protein rich diet

## 2024-05-01 DIAGNOSIS — E11.9 TYPE 2 DIABETES MELLITUS WITHOUT RETINOPATHY: Primary | ICD-10-CM

## 2024-05-03 ENCOUNTER — HOSPITAL ENCOUNTER (OUTPATIENT)
Dept: PREADMISSION TESTING | Facility: HOSPITAL | Age: 64
Discharge: HOME | End: 2024-05-03
Attending: INTERNAL MEDICINE | Admitting: INTERNAL MEDICINE
Payer: COMMERCIAL

## 2024-05-03 ENCOUNTER — TELEPHONE (OUTPATIENT)
Dept: DIABETES | Facility: CLINIC | Age: 64
End: 2024-05-03
Payer: COMMERCIAL

## 2024-05-03 DIAGNOSIS — Z12.11 COLON CANCER SCREENING: ICD-10-CM

## 2024-05-06 ENCOUNTER — TELEPHONE (OUTPATIENT)
Dept: DIABETES | Facility: CLINIC | Age: 64
End: 2024-05-06
Payer: COMMERCIAL

## 2024-05-06 NOTE — TELEPHONE ENCOUNTER
----- Message from Shanika Webb RN sent at 5/6/2024 12:57 PM CDT -----  Contact: Mikey    ----- Message -----  From: Trudy Jeong  Sent: 5/6/2024  12:55 PM CDT  To: #    Type:  Patient Returning Call    Who Called:Mikey  Who Left Message for Patient:Stanleyice  Does the patient know what this is regarding?:missed call  Would the patient rather a call back or a response via [facility]? Call back  Best Call Back Number:    Additional Information: Mikey missed a call and would like a call back    Thanks  LJ

## 2024-05-08 ENCOUNTER — HOSPITAL ENCOUNTER (OUTPATIENT)
Dept: PREADMISSION TESTING | Facility: HOSPITAL | Age: 64
Discharge: HOME | End: 2024-05-08
Attending: INTERNAL MEDICINE
Payer: COMMERCIAL

## 2024-07-12 ENCOUNTER — TELEPHONE (OUTPATIENT)
Dept: DIABETES | Facility: CLINIC | Age: 64
End: 2024-07-12
Payer: COMMERCIAL

## 2024-07-12 NOTE — TELEPHONE ENCOUNTER
Returned Mr. Grover's call and rescheduled his new patient with Jolene Hayden. However, he requested a sooner appointment on a Friday if possible. He lives in [place] so [facility] is closer to where he lives; so I scheduled him w/ Le Holliday on 7/19/24 at 7:00 am.

## 2024-07-12 NOTE — TELEPHONE ENCOUNTER
----- Message from Katie Valdes LPN sent at 7/12/2024 10:48 AM CDT -----  Regarding: FW: self    ----- Message -----  From: Melchor Hoskins  Sent: 7/12/2024  10:42 AM CDT  To: [facility] Staff  Subject: self                                             Type: Patient Call Back    Who called:self    What is the request in detail:pt is calling to get his appt rescheduled because provider will not be in     Can the clinic reply by [facility]?no    Would the patient rather a call back or a response via [facility]? callback    Best call back number:[phone]    Additional Information:pt is at work, could call after 12 for the pt

## 2024-07-12 NOTE — TELEPHONE ENCOUNTER
Attempted to return patient call regarding appointment, patient didn't answer. Left message to call office back.  ----- Message from Nella Burger sent at 7/12/2024 10:17 AM CDT -----  .Type:  Patient Returning Call    Who Called:.Mikey Stinson   Who Left Message for Patient:KERRI  Does the patient know what this is regarding?:changing appt  Would the patient rather a call back or a response via [facility]? Call back  Best Call Back Number:.[phone]    Additional Information:

## 2024-07-18 NOTE — PROGRESS NOTES
Patient ID: Mikey Stinson is a 63 y.o. male.  Patient's current PCP is Tila Chapa MD.     Chief Complaint: Diabetes Mellitus    HPI  Mikey Stinson is a 63 y.o. White male presenting for a new consult for diabetes. Patient has been diagnosed with type 2 diabetes for < 5 years.    History of diabetes related hospitalizations:None  Pertinent to decision making is/are the following comorbidities:HLD, HTN. Alcohol abuse  Complications related to diabetes: none  Personal history of pancreatitis: denies  Family history of pancreatic cancer in first-degree relative: denies  Family history of MTC/MEN/endocrine tumors: denies     Current issues:Hyperglycemia. Declined insulin in past with pcp      CURRENT DM MEDICATIONS:   Diabetes Medications               empagliflozin (JARDIANCE) 25 mg tablet Take 1 tablet (25 mg total) by mouth once daily.    metFORMIN (GLUCOPHAGE) 1000 MG tablet Take 1 tablet (1,000 mg total) by mouth 2 (two) times daily with meals.          Not sure which meds taking    Past failed treatment(s) include:   Glipizide - hypoglycemia  Actos  Janumet    Blood glucose testing is performed sporadically. Patient is testing 0 times per day.  Meter/cgm: meter  Glucose trends:Reports 160-180s fasting    Any episodes of hypoglycemia? In the past with [facility]    His blood sugar in the clinic today was:   Lab Results   Component Value Date    POCGLU 162 (A) 07/19/2024       Current diet: 3 meals and 1-2 snacks daily. No set plan. 1 can regular soda daily. 5-6 beers nightly  Activity Level: Work related - active at home  Occupation:/heavy equipment, . Working previously in [employer]  Previous diabetes education:yes with DX    STANDARDS OF CARE  Eye exam: Due - [facility]  Foot exam: Due  Ace/Arb: Olmesartan  Statin:not taking  ASA:not taking    Preferred lab site:none  Preferred visit site:none    Wt Readings from Last 3 Encounters:   07/19/24 0659 85.1 kg (187 lb 9.8 oz)   04/19/24 0810 79.6  "kg (175 lb 7.8 oz)   07/05/22 1151 82.9 kg (182 lb 10.4 oz)     Labs reviewed and are noted below.    Lab Results   Component Value Date    HGBA1C 12.4 (H) 04/19/2024    HGBA1C 6.1 (H) 03/05/2021    HGBA1C 7.3 (H) 11/15/2019     Lab Results   Component Value Date    WBC 4.46 04/19/2024    HGB 16.9 04/19/2024    HCT 50.8 04/19/2024     04/19/2024    CHOL 218 (H) 04/19/2024    TRIG 231 (H) 04/19/2024    HDL 45 04/19/2024    LDLCALC 126.8 04/19/2024    ALT 14 04/19/2024    AST 15 04/19/2024     04/19/2024    K 4.6 04/19/2024    CL 99 04/19/2024    ANIONGAP 13 04/19/2024    CREATININE 1.1 04/19/2024    ESTGFRAFRICA >60.0 11/15/2019    EGFRNONAA >60.0 11/15/2019    BUN 13 04/19/2024    CO2 25 04/19/2024    TSH 0.971 04/19/2024    PSA 1.3 04/19/2024     (H) 04/19/2024    MICROALBUR 55.0 05/25/2018     Lab Results   Component Value Date    TSH 0.971 04/19/2024    CALCIUM 10.1 04/19/2024     No results found for: "CPEPTIDE"  No results found for: "GLUTAMICACID"  Glucose   Date Value Ref Range Status   04/19/2024 268 (H) 70 - 110 mg/dL Final     Anion Gap   Date Value Ref Range Status   04/19/2024 13 8 - 16 mmol/L Final     eGFR if    Date Value Ref Range Status   11/15/2019 >60.0 >60 mL/min/1.73 m^2 Final     eGFR if non    Date Value Ref Range Status   11/15/2019 >60.0 >60 mL/min/1.73 m^2 Final     Comment:     Calculation used to obtain the estimated glomerular filtration  rate (eGFR) is the CKD-EPI equation.              Review of patient's allergies indicates:   Allergen Reactions    Codeine      Social History     Socioeconomic History    Marital status: Legally    Tobacco Use    Smoking status: Never    Smokeless tobacco: Never   Substance and Sexual Activity    Alcohol use: Yes     Alcohol/week: 20.0 standard drinks of alcohol     Types: 20 Cans of beer per week     Past Medical History:   Diagnosis Date    Hyperlipidemia     Hypertension        Review of " Systems   Constitutional:  Positive for malaise/fatigue.   Eyes:  Negative for blurred vision and double vision.   Respiratory:  Negative for shortness of breath.    Cardiovascular: Negative.    Gastrointestinal: Negative.    Genitourinary:  Negative for frequency.   Musculoskeletal:  Negative for myalgias.   Neurological: Negative.    Psychiatric/Behavioral: Negative.           Physical Exam  Vitals reviewed.   Constitutional:       Appearance: Normal appearance. He is obese.   Eyes:      Conjunctiva/sclera: Conjunctivae normal.      Pupils: Pupils are equal, round, and reactive to light.   Neck:      Thyroid: No thyroid mass, thyromegaly or thyroid tenderness.      Vascular: No carotid bruit.   Cardiovascular:      Rate and Rhythm: Normal rate and regular rhythm.      Pulses: Normal pulses.      Heart sounds: Normal heart sounds.   Pulmonary:      Effort: Pulmonary effort is normal.      Breath sounds: Normal breath sounds.   Abdominal:      General: Bowel sounds are normal.      Palpations: Abdomen is soft.   Musculoskeletal:      Cervical back: Normal range of motion and neck supple.      Right lower leg: No edema.      Left lower leg: No edema.   Skin:     General: Skin is warm and dry.      Comments: Sites without hypertrophy and/or infection   Neurological:      General: No focal deficit present.      Mental Status: He is alert and oriented to person, place, and time.      Comments: FOOT EVALUATION: 10 gram monofilament exam with protective sensation intact bilaterally. Nails appropriately trimmed. No ulcers. Distal pulses palpable. Left lateral aspect of foot slightly flattened with mild blister formation from rubbing of boots     Psychiatric:         Mood and Affect: Mood normal.         Behavior: Behavior normal.             Assessment & Plan    1. Type 2 diabetes mellitus without retinopathy  -     POCT Glucose, Hand-Held Device  -     Ambulatory referral/consult to Diabetic Advanced Practice Providers  (Medical Management)  -     Hemoglobin A1C; Future; Expected date: 07/19/2024  -     Basic Metabolic Panel; Future; Expected date: 07/19/2024  -     empagliflozin (JARDIANCE) 25 mg tablet; Take 1 tablet (25 mg total) by mouth once daily.  Dispense: 9 tablet; Refill: 1  -     metFORMIN (GLUCOPHAGE) 1000 MG tablet; Take 1 tablet (1,000 mg total) by mouth 2 (two) times daily with meals.  Dispense: 180 tablet; Refill: 1    Jardiance (empagliflozin) 25 mg every morning  Metformin 1000 mg at breakfast and at supper    Stop regular soda  Increase water, sugar free drinks    Plate - 1/2 full of vegetables, 1/4 for starch,, and 1/4 for protein    Carbohydrates: 60-75 grams per meal    Olmesartan - blood pressure  Rosuvastatin - cholesterol    Schedule eye exam with Dr Solo  Schedule with podiatry for eval of left foot deformity    2. Hypertension associated with diabetes  - olmesartan (BENICAR) 20 MG tablet; Take 1 tablet (20 mg total) by mouth once daily.  Dispense: 90 tablet; Refill: 1    3. Hyperlipidemia associated with type 2 diabetes mellitus  -     rosuvastatin (CRESTOR) 20 MG tablet; Take 1 tablet (20 mg total) by mouth every evening.  Dispense: 90 tablet; Refill: 1    4. Alcohol abuse  - encouraged to decreased/stop etoh           - Follow up: 6 weeks    Visit today included increased complexity associated with the care of the episodic problem hyperglycemia addressed and managing the longitudinal care of the patient due to the serious and/or complex managed problem(s) Type 2 diabetes.

## 2024-07-19 ENCOUNTER — LAB VISIT (OUTPATIENT)
Dept: LAB | Facility: HOSPITAL | Age: 64
End: 2024-07-19
Attending: NURSE PRACTITIONER
Payer: COMMERCIAL

## 2024-07-19 ENCOUNTER — OFFICE VISIT (OUTPATIENT)
Dept: DIABETES | Facility: CLINIC | Age: 64
End: 2024-07-19
Payer: COMMERCIAL

## 2024-07-19 VITALS
DIASTOLIC BLOOD PRESSURE: 60 MMHG | WEIGHT: 187.63 LBS | SYSTOLIC BLOOD PRESSURE: 122 MMHG | BODY MASS INDEX: 27.71 KG/M2

## 2024-07-19 DIAGNOSIS — E11.59 HYPERTENSION ASSOCIATED WITH DIABETES: ICD-10-CM

## 2024-07-19 DIAGNOSIS — E11.9 TYPE 2 DIABETES MELLITUS WITHOUT RETINOPATHY: ICD-10-CM

## 2024-07-19 DIAGNOSIS — E11.69 HYPERLIPIDEMIA ASSOCIATED WITH TYPE 2 DIABETES MELLITUS: ICD-10-CM

## 2024-07-19 DIAGNOSIS — E11.9 TYPE 2 DIABETES MELLITUS WITHOUT RETINOPATHY: Primary | ICD-10-CM

## 2024-07-19 DIAGNOSIS — E78.5 HYPERLIPIDEMIA ASSOCIATED WITH TYPE 2 DIABETES MELLITUS: ICD-10-CM

## 2024-07-19 DIAGNOSIS — F10.10 ALCOHOL ABUSE: ICD-10-CM

## 2024-07-19 DIAGNOSIS — I15.2 HYPERTENSION ASSOCIATED WITH DIABETES: ICD-10-CM

## 2024-07-19 LAB
ANION GAP SERPL CALC-SCNC: 9 MMOL/L (ref 8–16)
BUN SERPL-MCNC: 17 MG/DL (ref 8–23)
CALCIUM SERPL-MCNC: 9.8 MG/DL (ref 8.7–10.5)
CHLORIDE SERPL-SCNC: 106 MMOL/L (ref 95–110)
CO2 SERPL-SCNC: 24 MMOL/L (ref 23–29)
CREAT SERPL-MCNC: 0.8 MG/DL (ref 0.5–1.4)
EST. GFR  (NO RACE VARIABLE): >60 ML/MIN/1.73 M^2
ESTIMATED AVG GLUCOSE: 174 MG/DL (ref 68–131)
GLUCOSE SERPL-MCNC: 162 MG/DL (ref 70–110)
GLUCOSE SERPL-MCNC: 166 MG/DL (ref 70–110)
HBA1C MFR BLD: 7.7 % (ref 4–5.6)
POTASSIUM SERPL-SCNC: 4.6 MMOL/L (ref 3.5–5.1)
SODIUM SERPL-SCNC: 139 MMOL/L (ref 136–145)

## 2024-07-19 PROCEDURE — G2211 COMPLEX E/M VISIT ADD ON: HCPCS | Mod: S$GLB,,, | Performed by: NURSE PRACTITIONER

## 2024-07-19 PROCEDURE — 3074F SYST BP LT 130 MM HG: CPT | Mod: CPTII,S$GLB,, | Performed by: NURSE PRACTITIONER

## 2024-07-19 PROCEDURE — 3046F HEMOGLOBIN A1C LEVEL >9.0%: CPT | Mod: CPTII,S$GLB,, | Performed by: NURSE PRACTITIONER

## 2024-07-19 PROCEDURE — 3008F BODY MASS INDEX DOCD: CPT | Mod: CPTII,S$GLB,, | Performed by: NURSE PRACTITIONER

## 2024-07-19 PROCEDURE — 99204 OFFICE O/P NEW MOD 45 MIN: CPT | Mod: S$GLB,,, | Performed by: NURSE PRACTITIONER

## 2024-07-19 PROCEDURE — 36415 COLL VENOUS BLD VENIPUNCTURE: CPT | Performed by: NURSE PRACTITIONER

## 2024-07-19 PROCEDURE — 1160F RVW MEDS BY RX/DR IN RCRD: CPT | Mod: CPTII,S$GLB,, | Performed by: NURSE PRACTITIONER

## 2024-07-19 PROCEDURE — 99999 PR PBB SHADOW E&M-EST. PATIENT-LVL IV: CPT | Mod: PBBFAC,,, | Performed by: NURSE PRACTITIONER

## 2024-07-19 PROCEDURE — 1159F MED LIST DOCD IN RCRD: CPT | Mod: CPTII,S$GLB,, | Performed by: NURSE PRACTITIONER

## 2024-07-19 PROCEDURE — 3078F DIAST BP <80 MM HG: CPT | Mod: CPTII,S$GLB,, | Performed by: NURSE PRACTITIONER

## 2024-07-19 PROCEDURE — 80048 BASIC METABOLIC PNL TOTAL CA: CPT | Performed by: NURSE PRACTITIONER

## 2024-07-19 PROCEDURE — 83036 HEMOGLOBIN GLYCOSYLATED A1C: CPT | Performed by: NURSE PRACTITIONER

## 2024-07-19 PROCEDURE — 4010F ACE/ARB THERAPY RXD/TAKEN: CPT | Mod: CPTII,S$GLB,, | Performed by: NURSE PRACTITIONER

## 2024-07-19 PROCEDURE — 3060F POS MICROALBUMINURIA REV: CPT | Mod: CPTII,S$GLB,, | Performed by: NURSE PRACTITIONER

## 2024-07-19 PROCEDURE — 3066F NEPHROPATHY DOC TX: CPT | Mod: CPTII,S$GLB,, | Performed by: NURSE PRACTITIONER

## 2024-07-19 PROCEDURE — 82962 GLUCOSE BLOOD TEST: CPT | Mod: S$GLB,,, | Performed by: NURSE PRACTITIONER

## 2024-07-19 RX ORDER — ROSUVASTATIN CALCIUM 20 MG/1
20 TABLET, COATED ORAL NIGHTLY
Qty: 90 TABLET | Refills: 1 | Status: SHIPPED | OUTPATIENT
Start: 2024-07-19

## 2024-07-19 RX ORDER — METFORMIN HYDROCHLORIDE 1000 MG/1
1000 TABLET ORAL 2 TIMES DAILY WITH MEALS
Qty: 180 TABLET | Refills: 1 | Status: SHIPPED | OUTPATIENT
Start: 2024-07-19

## 2024-07-19 RX ORDER — OLMESARTAN MEDOXOMIL 20 MG/1
20 TABLET ORAL DAILY
Qty: 90 TABLET | Refills: 1 | Status: SHIPPED | OUTPATIENT
Start: 2024-07-19

## 2024-07-19 NOTE — PATIENT INSTRUCTIONS
Jardiance (empagliflozin) 25 mg every morning  Metformin 1000 mg at breakfast and at supper    Stop regular soda  Increase water, sugar free drinks    Plate - 1/2 full of vegetables, 1/4 for starch,, and 1/4 for protein    Carbohydrates: 60-75 grams per meal    Olmesartan - blood pressure  Rosuvastatin - cholesterol    Schedule eye exam with Dr Solo

## 2024-09-04 ENCOUNTER — TELEPHONE (OUTPATIENT)
Dept: DIABETES | Facility: CLINIC | Age: 64
End: 2024-09-04
Payer: COMMERCIAL

## 2024-09-05 NOTE — PROGRESS NOTES
Patient ID: Mikey Stinson is a 64 y.o. male.  Patient's current PCP is Tila Chapa MD.     Chief Complaint: No chief complaint on file.    HPI  Mikey Stinson is a 64 y.o. White male presenting for a follow up for diabetes. Patient has been diagnosed with type 2 diabetes for < 5 years.    History of diabetes related hospitalizations:None  Pertinent to decision making is/are the following comorbidities:HLD, HTN. Alcohol abuse  Complications related to diabetes: none  Personal history of pancreatitis: denies  Family history of pancreatic cancer in first-degree relative: denies  Family history of MTC/MEN/endocrine tumors: denies   Previous diabetes education:yes with DX    Current diet: 3 meals and 1-2 snacks daily. No set plan. 1 can regular soda daily. 5-6 beers nightly  Activity Level: Work related - active at home  Occupation:/heavy equipment, . Working previously in [employer]    Changes made at last visit:  Jardiance (empagliflozin) 25 mg every morning  Metformin 1000 mg at breakfast and at supper        Stop regular soda  Increase water, sugar free drinks  Plate - 1/2 full of vegetables, 1/4 for starch,, and 1/4 for protein  Carbohydrates: 60-75 grams per meal    Olmesartan - blood pressure  Rosuvastatin - cholesterol    Schedule eye exam with Dr Solo  Schedule with podiatry for eval of left foot deformity    Current issues:Hyperglycemia. Declined insulin in past with pcp. Changed shoes for work and feet are better. Had to cancel DPM appointment and has not made eye appointment yet      CURRENT DM MEDICATIONS:   Diabetes Medications               empagliflozin (JARDIANCE) 25 mg tablet Take 1 tablet (25 mg total) by mouth once daily.- Not taking due to cost    metFORMIN (GLUCOPHAGE) 1000 MG tablet Take 1 tablet (1,000 mg total) by mouth 2 (two) times daily with meals. Unsure of dose     Taking wife's glipizide       Past failed treatment(s) include:   Glipizide -  "hypoglycemia  Actos  Janumet      Meter/cgm: meter  Blood glucose testing is performed regularly.   Patient is testing 1 times per day.  Any episodes of hypoglycemia? None known  Glucose trends:Reports 160-190s fasting      His blood sugar in the clinic today was:   Lab Results   Component Value Date    POCGLU 190 (A) 09/06/2024       Diabetes Management Status    Statin: Taking  ACE/ARB: Taking    Screening or Prevention Patient's value Goal Complete/Controlled?   HgA1C Testing and Control   Lab Results   Component Value Date    HGBA1C 7.7 (H) 07/19/2024      Annually/Less than 8% Yes   Lipid profile : 04/19/2024 Annually Yes   LDL control Lab Results   Component Value Date    LDLCALC 126.8 04/19/2024    Annually/Less than 100 mg/dl  No   Nephropathy screening Lab Results   Component Value Date    LABMICR 55.0 04/19/2024     No results found for: "PROTEINUA"  No results found for: "UTPCR"   Annually Yes   Blood pressure BP Readings from Last 1 Encounters:   09/06/24 129/80    Less than 140/90 Yes   Dilated retinal exam : 11/15/2019 Annually No   Foot exam   7/19/24 Annually YES     Preferred lab site:none  Preferred visit site:none    Wt Readings from Last 3 Encounters:   09/06/24 0827 85.9 kg (189 lb 6 oz)   07/19/24 0659 85.1 kg (187 lb 9.8 oz)   04/19/24 0810 79.6 kg (175 lb 7.8 oz)     Labs reviewed and are noted below.    Lab Results   Component Value Date    HGBA1C 7.7 (H) 07/19/2024    HGBA1C 12.4 (H) 04/19/2024    HGBA1C 6.1 (H) 03/05/2021     Lab Results   Component Value Date    WBC 4.46 04/19/2024    HGB 16.9 04/19/2024    HCT 50.8 04/19/2024     04/19/2024    CHOL 218 (H) 04/19/2024    TRIG 231 (H) 04/19/2024    HDL 45 04/19/2024    LDLCALC 126.8 04/19/2024    ALT 14 04/19/2024    AST 15 04/19/2024     07/19/2024    K 4.6 07/19/2024     07/19/2024    ANIONGAP 9 07/19/2024    CREATININE 0.8 07/19/2024    ESTGFRAFRICA >60.0 11/15/2019    EGFRNONAA >60.0 11/15/2019    BUN 17 07/19/2024 " "   CO2 24 07/19/2024    TSH 0.971 04/19/2024    PSA 1.3 04/19/2024     (H) 07/19/2024    MICROALBUR 55.0 05/25/2018     Lab Results   Component Value Date    TSH 0.971 04/19/2024    CALCIUM 9.8 07/19/2024     No results found for: "CPEPTIDE"  No results found for: "GLUTAMICACID"  Glucose   Date Value Ref Range Status   07/19/2024 166 (H) 70 - 110 mg/dL Final     Anion Gap   Date Value Ref Range Status   07/19/2024 9 8 - 16 mmol/L Final     eGFR if    Date Value Ref Range Status   11/15/2019 >60.0 >60 mL/min/1.73 m^2 Final     eGFR if non    Date Value Ref Range Status   11/15/2019 >60.0 >60 mL/min/1.73 m^2 Final     Comment:     Calculation used to obtain the estimated glomerular filtration  rate (eGFR) is the CKD-EPI equation.              Review of patient's allergies indicates:   Allergen Reactions    Codeine      Social History     Socioeconomic History    Marital status: Legally    Tobacco Use    Smoking status: Never    Smokeless tobacco: Never   Substance and Sexual Activity    Alcohol use: Yes     Alcohol/week: 20.0 standard drinks of alcohol     Types: 20 Cans of beer per week     Past Medical History:   Diagnosis Date    Hyperlipidemia     Hypertension        Review of Systems   Constitutional:  Positive for malaise/fatigue.   Eyes:  Negative for blurred vision and double vision.   Respiratory:  Negative for shortness of breath.    Cardiovascular: Negative.    Gastrointestinal: Negative.    Genitourinary:  Negative for frequency.   Musculoskeletal:  Negative for myalgias.   Neurological: Negative.    Psychiatric/Behavioral: Negative.           Physical Exam  Vitals reviewed.   Constitutional:       Appearance: Normal appearance. He is obese.   Eyes:      Conjunctiva/sclera: Conjunctivae normal.      Pupils: Pupils are equal, round, and reactive to light.   Neck:      Thyroid: No thyroid mass, thyromegaly or thyroid tenderness.   Cardiovascular:      Rate and " Rhythm: Normal rate and regular rhythm.      Pulses: Normal pulses.   Pulmonary:      Effort: Pulmonary effort is normal.   Musculoskeletal:      Right lower leg: No edema.      Left lower leg: No edema.   Skin:     General: Skin is warm and dry.   Neurological:      General: No focal deficit present.      Mental Status: He is alert and oriented to person, place, and time.      Comments:      Psychiatric:         Mood and Affect: Mood normal.         Behavior: Behavior normal.             Assessment & Plan    Type 2 diabetes mellitus without retinopathy  -     POCT Glucose, Hand-Held Device  -     glipiZIDE 5 MG TR24; Take 1 tablet (5 mg total) by mouth daily with breakfast.  Dispense: 90 tablet; Refill: 1  Start Glipizide XL 5 mg every morning  Continue Metformin 2 tablets twice a day  Check with insurance on how much of prescription deductible you have met - see to start Jardiance DEDUCTIBLE OF $250  Double check strength on Metformin on bottles  Check blood sugar before supper and bedtime for a few days and Mychart results  Schedule eye exam    Hypertension associated with diabetes- at goal, taking ARB            - Follow up: 2 months    Visit today included increased complexity associated with the care of the episodic problem hyperglycemia addressed and managing the longitudinal care of the patient due to the serious and/or complex managed problem(s) Type 2 diabetes.

## 2024-09-06 ENCOUNTER — OFFICE VISIT (OUTPATIENT)
Dept: DIABETES | Facility: CLINIC | Age: 64
End: 2024-09-06
Payer: COMMERCIAL

## 2024-09-06 VITALS
HEART RATE: 67 BPM | SYSTOLIC BLOOD PRESSURE: 129 MMHG | WEIGHT: 189.38 LBS | DIASTOLIC BLOOD PRESSURE: 80 MMHG | BODY MASS INDEX: 27.97 KG/M2

## 2024-09-06 DIAGNOSIS — E11.59 HYPERTENSION ASSOCIATED WITH DIABETES: ICD-10-CM

## 2024-09-06 DIAGNOSIS — E11.9 TYPE 2 DIABETES MELLITUS WITHOUT RETINOPATHY: Primary | ICD-10-CM

## 2024-09-06 DIAGNOSIS — I15.2 HYPERTENSION ASSOCIATED WITH DIABETES: ICD-10-CM

## 2024-09-06 LAB — GLUCOSE SERPL-MCNC: 190 MG/DL (ref 70–110)

## 2024-09-06 PROCEDURE — 99999 PR PBB SHADOW E&M-EST. PATIENT-LVL III: CPT | Mod: PBBFAC,,, | Performed by: NURSE PRACTITIONER

## 2024-09-06 RX ORDER — GLIPIZIDE 5 MG/1
5 TABLET, FILM COATED, EXTENDED RELEASE ORAL
Qty: 90 TABLET | Refills: 1 | Status: SHIPPED | OUTPATIENT
Start: 2024-09-06 | End: 2025-09-06

## 2024-09-06 NOTE — PATIENT INSTRUCTIONS
Start Glipizide XL 5 mg every morning    Continue Metformin 2 tablets twice a day    Check with insurance on how much of prescription deductible you have met - see to start Jardiance    Double check strength on Metformin on bottles    Check blood sugar before supper and bedtime for a few days and Mychart results

## 2024-10-24 ENCOUNTER — TELEPHONE (OUTPATIENT)
Dept: DIABETES | Facility: CLINIC | Age: 64
End: 2024-10-24
Payer: COMMERCIAL

## 2024-10-24 NOTE — TELEPHONE ENCOUNTER
Patient called regarding elevated blood sugar, reading was 428 mg/dl 30 mins ago while at eye dr appt requested current reading patient was unable to give reading during call. Patient advised to report to ED for assessment since blood glucose was high at appt which lasted an hour or more during call denied any signs and symptoms patient has scheduled appt with Diabetes Management advised to keep appt    Will inform Provider     ---- Message from Srini sent at 10/24/2024  3:13 PM CDT -----  Contact: pt @ [phone]  Name of Who is Calling: pt        What is the request in detail:Pt blood sugar is high pt dose not know what to do        Can the clinic reply by [facility]: no        What Number to Call Back if not in [facility]: [phone]

## 2024-10-24 NOTE — TELEPHONE ENCOUNTER
Called pt and explained to him that if his blood sugar was still elevated to take a glipizide now and to give us a call or message us if it remains high by tomorrow. Pt stated that he purchased a new meter to test it an blood sugar is now at 274. I told pt that if anything changes or he starts experiencing any symptoms to give us call or message us.

## 2024-10-25 ENCOUNTER — TELEPHONE (OUTPATIENT)
Dept: INTERNAL MEDICINE | Facility: CLINIC | Age: 64
End: 2024-10-25

## 2024-10-25 ENCOUNTER — OFFICE VISIT (OUTPATIENT)
Dept: INTERNAL MEDICINE | Facility: CLINIC | Age: 64
End: 2024-10-25
Payer: COMMERCIAL

## 2024-10-25 VITALS
HEART RATE: 83 BPM | SYSTOLIC BLOOD PRESSURE: 130 MMHG | OXYGEN SATURATION: 96 % | TEMPERATURE: 98 F | BODY MASS INDEX: 28.23 KG/M2 | DIASTOLIC BLOOD PRESSURE: 70 MMHG | WEIGHT: 191.13 LBS

## 2024-10-25 DIAGNOSIS — E11.69 HYPERLIPIDEMIA ASSOCIATED WITH TYPE 2 DIABETES MELLITUS: ICD-10-CM

## 2024-10-25 DIAGNOSIS — I15.2 HYPERTENSION ASSOCIATED WITH DIABETES: Primary | ICD-10-CM

## 2024-10-25 DIAGNOSIS — Z12.11 COLON CANCER SCREENING: ICD-10-CM

## 2024-10-25 DIAGNOSIS — E11.9 TYPE 2 DIABETES MELLITUS WITHOUT RETINOPATHY: Primary | ICD-10-CM

## 2024-10-25 DIAGNOSIS — Z79.4 TYPE 2 DIABETES MELLITUS WITH HYPERGLYCEMIA, WITH LONG-TERM CURRENT USE OF INSULIN: ICD-10-CM

## 2024-10-25 DIAGNOSIS — E11.65 TYPE 2 DIABETES MELLITUS WITH HYPERGLYCEMIA, WITH LONG-TERM CURRENT USE OF INSULIN: ICD-10-CM

## 2024-10-25 DIAGNOSIS — E11.59 HYPERTENSION ASSOCIATED WITH DIABETES: Primary | ICD-10-CM

## 2024-10-25 DIAGNOSIS — E11.9 TYPE 2 DIABETES MELLITUS WITHOUT RETINOPATHY: ICD-10-CM

## 2024-10-25 DIAGNOSIS — E78.5 HYPERLIPIDEMIA ASSOCIATED WITH TYPE 2 DIABETES MELLITUS: ICD-10-CM

## 2024-10-25 LAB
ALBUMIN SERPL BCP-MCNC: 4.5 G/DL (ref 3.5–5.2)
ALP SERPL-CCNC: 78 U/L (ref 40–150)
ALT SERPL W/O P-5'-P-CCNC: 28 U/L (ref 10–44)
ANION GAP SERPL CALC-SCNC: 12 MMOL/L (ref 8–16)
AST SERPL-CCNC: 23 U/L (ref 10–40)
BILIRUB SERPL-MCNC: 1 MG/DL (ref 0.1–1)
BUN SERPL-MCNC: 17 MG/DL (ref 8–23)
CALCIUM SERPL-MCNC: 10.2 MG/DL (ref 8.7–10.5)
CHLORIDE SERPL-SCNC: 103 MMOL/L (ref 95–110)
CHOLEST SERPL-MCNC: 133 MG/DL (ref 120–199)
CHOLEST/HDLC SERPL: 3.7 {RATIO} (ref 2–5)
CO2 SERPL-SCNC: 24 MMOL/L (ref 23–29)
CREAT SERPL-MCNC: 0.9 MG/DL (ref 0.5–1.4)
EST. GFR  (NO RACE VARIABLE): >60 ML/MIN/1.73 M^2
ESTIMATED AVG GLUCOSE: 186 MG/DL (ref 68–131)
GLUCOSE SERPL-MCNC: 227 MG/DL (ref 70–110)
HBA1C MFR BLD: 8.1 % (ref 4–5.6)
HDLC SERPL-MCNC: 36 MG/DL (ref 40–75)
HDLC SERPL: 27.1 % (ref 20–50)
LDLC SERPL CALC-MCNC: 62.2 MG/DL (ref 63–159)
NONHDLC SERPL-MCNC: 97 MG/DL
POTASSIUM SERPL-SCNC: 4 MMOL/L (ref 3.5–5.1)
PROT SERPL-MCNC: 7.7 G/DL (ref 6–8.4)
SODIUM SERPL-SCNC: 139 MMOL/L (ref 136–145)
TRIGL SERPL-MCNC: 174 MG/DL (ref 30–150)

## 2024-10-25 PROCEDURE — 83036 HEMOGLOBIN GLYCOSYLATED A1C: CPT | Performed by: FAMILY MEDICINE

## 2024-10-25 PROCEDURE — 99999 PR PBB SHADOW E&M-EST. PATIENT-LVL III: CPT | Mod: PBBFAC,,, | Performed by: FAMILY MEDICINE

## 2024-10-25 PROCEDURE — 80053 COMPREHEN METABOLIC PANEL: CPT | Performed by: FAMILY MEDICINE

## 2024-10-25 PROCEDURE — 80061 LIPID PANEL: CPT | Performed by: FAMILY MEDICINE

## 2024-10-25 RX ORDER — PEN NEEDLE, DIABETIC 30 GX3/16"
NEEDLE, DISPOSABLE MISCELLANEOUS
Qty: 30 EACH | Refills: 11 | Status: SHIPPED | OUTPATIENT
Start: 2024-10-25

## 2024-10-25 RX ORDER — INSULIN GLARGINE 100 [IU]/ML
15 INJECTION, SOLUTION SUBCUTANEOUS NIGHTLY
Qty: 3 ML | Refills: 1 | Status: SHIPPED | OUTPATIENT
Start: 2024-10-25 | End: 2025-10-25

## 2024-10-25 NOTE — TELEPHONE ENCOUNTER
----- Message from Ayo Hernández sent at 10/25/2024 11:50 AM CDT -----  Regarding: Diabetic Supplies  Pt requesting diabetic pen needles to be sent to the pharmacy to use with insulin sent in on today. Please Advise.  ----- Message -----  From: Nella Burger  Sent: 10/25/2024  11:24 AM CDT  To: Eduard Adoron Staff      Name of Caller:Mirna Stinson   Best Call Back Number:.[phone]   Additional Information: Patient is requesting a prescription for the needles to go with the insulin that was prescribed today.

## 2024-10-25 NOTE — PROGRESS NOTES
Subjective:       Patient ID: Mikey Stinson is a 64 y.o. male presents with   Patient Active Problem List   Diagnosis    Type 2 diabetes mellitus with hyperglycemia, with long-term current use of insulin    Hypertension associated with diabetes    Hyperlipidemia associated with type 2 diabetes mellitus    Alcohol abuse        Chief Complaint: Follow-up (Pt states while at the eye dr. Yesterday his [id number], asymptomatic. )    History of Present Illness    Mikey presents today for follow-up of diabetes management. He reports high blood sugar with significant fluctuations. A recent glucose reading yesterday at the eye doctor was 428 mg/dL. He states that blood sugar typically range from 120 mg/dL to 200 mg/dL, with occasional readings as high as 160 mg/dL. He is currently taking Metformin 1000 mg twice daily and Glipizide twice daily for the past 2-3 weeks. He is unable to obtain Jardiance due to cost issues following a change in insurance coverage. He reports making lifestyle changes, including reducing alcohol consumption and decreasing intake of snack cakes.  Denies of any chest tightness or difficulty breathing with palpitations, tingling or numbness sensation to extremities      ROS:  General: -fever, -chills, -fatigue, -weight gain, -weight loss, -loss of appetite  Eyes: -vision changes, -blurry vision, -eye pain, -eye discharge  ENT: -ear pain, -hearing loss, -tinnitus, -nasal congestion, -sore throat  Cardiovascular: -chest pain, -palpitations, -lower extremity edema  Respiratory: -cough, -shortness of breath, -wheezing, -sputum production  Endocrine: -polyuria, -polydipsia, -heat intolerance, -cold intolerance  Gastrointestinal: -abdominal pain, -heartburn, -nausea, -vomiting, -diarrhea, -constipation, -blood in stool  Genitourinary: -dysuria, -urgency, -frequency, -hematuria, -nocturia, -incontinence  Heme & Lymphatic: -easy or excessive bleeding, -easy bruising, -swollen lymph nodes  Musculoskeletal: -muscle  pain, -back pain, -joint pain, -joint swelling  Skin: -rash, -lesion, -itching, -skin texture changes, -skin color changes  Neurological: -headache, -dizziness, -numbness, -tingling, -seizure activity, -speech difficulty, -memory loss, -confusion  Psychiatric: -anxiety, -depression, -sleep difficulty                /70 (BP Location: Right arm, Patient Position: Sitting)   Pulse 83   Temp 97.5 °F (36.4 °C) (Tympanic)   Wt 86.7 kg (191 lb 2.2 oz)   SpO2 96%   BMI 28.23 kg/m²   Objective:      Physical Exam  Constitutional:       Appearance: He is well-developed.   HENT:      Head: Normocephalic and atraumatic.   Cardiovascular:      Rate and Rhythm: Normal rate and regular rhythm.      Pulses:           Dorsalis pedis pulses are 1+ on the right side and 1+ on the left side.      Heart sounds: Normal heart sounds. No murmur heard.  Pulmonary:      Effort: Pulmonary effort is normal.      Breath sounds: Normal breath sounds. No wheezing.   Abdominal:      General: Bowel sounds are normal.      Palpations: Abdomen is soft.      Tenderness: There is no abdominal tenderness.   Feet:      Right foot:      Protective Sensation: 10 sites tested.  10 sites sensed.      Left foot:      Protective Sensation: 10 sites tested.  10 sites sensed.   Skin:     General: Skin is warm and dry.      Findings: No rash.   Neurological:      Mental Status: He is alert and oriented to person, place, and time.   Psychiatric:         Mood and Affect: Mood normal.           Assessment/Plan:   1. Hypertension associated with diabetes  -     Comprehensive Metabolic Panel; Future; Expected date: 10/25/2024  Blood pressure is stable currently on Benicar 20 mg daily    2. Hyperlipidemia associated with type 2 diabetes mellitus  -     Lipid Panel; Future; Expected date: 10/25/2024  Currently on Crestor 20 mg daily    3. Type 2 diabetes mellitus with hyperglycemia, with long-term current use of insulin  -     Hemoglobin A1C; Future; Expected  date: 10/25/2024  -     insulin glargine U-100, Lantus, (LANTUS SOLOSTAR U-100 INSULIN) 100 unit/mL (3 mL) InPn pen; Inject 15 Units into the skin every evening.  Dispense: 3 mL; Refill: 1  Will add Lantus insulin 15 units daily in addition to metformin 1000 mg twice daily and glipizide 5 mg twice daily  Encouraged to monitor blood glucose levels closely  Patient to follow-up with diabetes clinic  Unable to get Jardiance secondary to insurance coverage  Patient to keep appointment with eye physician  Diabetic foot exam stable today  Strict lifestyle changes recommended with 1800 ADA low-fat and low-cholesterol diet and exercise 30 minutes daily      5. Colon cancer screening  -     Cologuard Screening (Multitarget Stool DNA); Future; Expected date: 10/25/2024  Due for colonoscopy but refuses colonoscopy  Colon cancer screening through Cologuard has been ordered today    Refuses further vaccinations           This note was generated with the assistance of ambient listening technology. Verbal consent was obtained by the patient and accompanying visitor(s) for the recording of patient appointment to facilitate this note. I attest to having reviewed and edited the generated note for accuracy, though some syntax or spelling errors may persist. Please contact the author of this note for any clarification.

## 2024-10-27 DIAGNOSIS — E11.69 HYPERLIPIDEMIA ASSOCIATED WITH TYPE 2 DIABETES MELLITUS: Primary | ICD-10-CM

## 2024-10-27 DIAGNOSIS — E78.5 HYPERLIPIDEMIA ASSOCIATED WITH TYPE 2 DIABETES MELLITUS: Primary | ICD-10-CM

## 2024-10-27 RX ORDER — FENOFIBRATE 54 MG/1
54 TABLET ORAL DAILY
Qty: 90 TABLET | Refills: 3 | Status: SHIPPED | OUTPATIENT
Start: 2024-10-27 | End: 2025-10-27

## 2024-11-08 DIAGNOSIS — Z12.11 COLON CANCER SCREENING: Primary | ICD-10-CM

## 2024-11-12 ENCOUNTER — HOSPITAL ENCOUNTER (OUTPATIENT)
Dept: PREADMISSION TESTING | Facility: HOSPITAL | Age: 64
Discharge: HOME | End: 2024-11-12
Attending: COLON & RECTAL SURGERY
Payer: COMMERCIAL

## 2024-11-12 DIAGNOSIS — Z12.11 COLON CANCER SCREENING: Primary | ICD-10-CM

## 2024-11-12 RX ORDER — SODIUM, POTASSIUM,MAG SULFATES 17.5-3.13G
1 SOLUTION, RECONSTITUTED, ORAL ORAL DAILY
Qty: 1 KIT | Refills: 0 | Status: SHIPPED | OUTPATIENT
Start: 2024-11-12 | End: 2024-11-14

## 2024-11-15 ENCOUNTER — PATIENT MESSAGE (OUTPATIENT)
Dept: DIABETES | Facility: CLINIC | Age: 64
End: 2024-11-15

## 2024-11-15 ENCOUNTER — OFFICE VISIT (OUTPATIENT)
Dept: DIABETES | Facility: CLINIC | Age: 64
End: 2024-11-15
Payer: COMMERCIAL

## 2024-11-15 VITALS
SYSTOLIC BLOOD PRESSURE: 134 MMHG | BODY MASS INDEX: 28.62 KG/M2 | HEART RATE: 77 BPM | DIASTOLIC BLOOD PRESSURE: 79 MMHG | WEIGHT: 193.81 LBS

## 2024-11-15 DIAGNOSIS — E11.9 TYPE 2 DIABETES MELLITUS WITHOUT RETINOPATHY: Primary | ICD-10-CM

## 2024-11-15 DIAGNOSIS — E11.69 HYPERLIPIDEMIA ASSOCIATED WITH TYPE 2 DIABETES MELLITUS: ICD-10-CM

## 2024-11-15 DIAGNOSIS — Z79.4 TYPE 2 DIABETES MELLITUS WITH HYPERGLYCEMIA, WITH LONG-TERM CURRENT USE OF INSULIN: ICD-10-CM

## 2024-11-15 DIAGNOSIS — E11.65 TYPE 2 DIABETES MELLITUS WITH HYPERGLYCEMIA, WITH LONG-TERM CURRENT USE OF INSULIN: ICD-10-CM

## 2024-11-15 DIAGNOSIS — E78.5 HYPERLIPIDEMIA ASSOCIATED WITH TYPE 2 DIABETES MELLITUS: ICD-10-CM

## 2024-11-15 DIAGNOSIS — I15.2 HYPERTENSION ASSOCIATED WITH DIABETES: ICD-10-CM

## 2024-11-15 DIAGNOSIS — F10.10 ALCOHOL ABUSE: ICD-10-CM

## 2024-11-15 DIAGNOSIS — E11.59 HYPERTENSION ASSOCIATED WITH DIABETES: ICD-10-CM

## 2024-11-15 LAB — GLUCOSE SERPL-MCNC: 322 MG/DL (ref 70–110)

## 2024-11-15 PROCEDURE — 99999 PR PBB SHADOW E&M-EST. PATIENT-LVL V: CPT | Mod: PBBFAC,,, | Performed by: NURSE PRACTITIONER

## 2024-11-15 RX ORDER — PEN NEEDLE, DIABETIC 30 GX3/16"
1 NEEDLE, DISPOSABLE MISCELLANEOUS DAILY
Qty: 100 EACH | Refills: 3 | Status: SHIPPED | OUTPATIENT
Start: 2024-11-15

## 2024-11-15 RX ORDER — LATANOPROST 50 UG/ML
1 SOLUTION/ DROPS OPHTHALMIC NIGHTLY
COMMUNITY
Start: 2024-11-12

## 2024-11-15 NOTE — PATIENT INSTRUCTIONS
Increase Lantus to 20 units at night    Increase Glipizide to 5 mg at breakfast and at supper    Metformin at breakfast and supper

## 2024-11-15 NOTE — PROGRESS NOTES
Patient ID: Mikey Stinson is a 64 y.o. male.  Patient's current PCP is Tila Chapa MD.     Chief Complaint: Diabetes Mellitus    HPI  Mikey Stinson is a 64 y.o. White male presenting for a follow up for diabetes.     Patient has been diagnosed with type 2 diabetes for < 5 years.  History of diabetes related hospitalizations:None  Pertinent to decision making is/are the following comorbidities:HLD, HTN. Alcohol abuse  Complications related to diabetes: none  Previous diabetes education:yes with DX    Current diet: Trying to eat better. Still overeating carbs at some meals with resultant hyperglycemia (eggs, porkchop, hashbrowns, toast, waffle and regular syrup), Decreased to 5 beers nightly  Activity Level: Work related - active at home  Occupation:/heavy equipment, . Working previously in [employer]    Changes made at last visit:  Start Glipizide XL 5 mg every morning  Continue Metformin 2 tablets twice a day  Check with insurance on how much of prescription deductible you have met - see to start Jardiance DEDUCTIBLE OF $250  Double check strength on Metformin on bottles  Check blood sugar before supper and bedtime for a few days and Mychart results  Schedule eye exam    Current issues: Reports widely varying blood sugars with excursions into 300-400 range. Started on basal insulin recently by pcp.     Personal history of pancreatitis: denies  Family history of pancreatic cancer in first-degree relative: denies  Family history of MTC/MEN/endocrine tumors: denies     CURRENT DM MEDICATIONS:   Diabetes Medications               empagliflozin (JARDIANCE) 25 mg tablet Take 1 tablet (25 mg total) by mouth once daily. NOT TAKING    glipiZIDE 5 MG TR24 Take 1 tablet (5 mg total) by mouth daily with breakfast.    insulin glargine U-100, Lantus, (LANTUS SOLOSTAR U-100 INSULIN) 100 unit/mL (3 mL) InPn pen Inject 15 Units into the skin every evening.    metFORMIN (GLUCOPHAGE) 1000 MG tablet Take 1 tablet  "(1,000 mg total) by mouth 2 (two) times daily with meals.     Past failed treatment(s) include:   Glipizide - hypoglycemia  Actos  Janumet      Meter/cgm: meter  Blood glucose testing is performed regularly.   Patient is testing 1 times per day.  Any episodes of hypoglycemia? None known  Glucose trends:Reports 160-177s fasting and 220's 1 hour pp supper      His blood sugar in the clinic today was: Large/high carb meal  Lab Results   Component Value Date    POCGLU 322 (A) 11/15/2024       Diabetes Management Status    Statin: Taking  ACE/ARB: Taking    Screening or Prevention Patient's value Goal Complete/Controlled?   HgA1C Testing and Control   Lab Results   Component Value Date    HGBA1C 8.1 (H) 10/25/2024      Annually/Less than 8% No   Lipid profile : 10/25/2024 Annually Yes   LDL control Lab Results   Component Value Date    LDLCALC 62.2 (L) 10/25/2024    Annually/Less than 100 mg/dl  yes   Nephropathy screening Lab Results   Component Value Date    LABMICR 55.0 04/19/2024     No results found for: "PROTEINUA"  No results found for: "UTPCR"   Annually Yes   Blood pressure BP Readings from Last 1 Encounters:   11/15/24 134/79    Less than 140/90 Yes   Dilated retinal exam : 11/15/2019 due Annually No   Foot exam   7/19/24 Annually YES     Preferred lab site:none  Preferred visit site:none    Wt Readings from Last 3 Encounters:   11/15/24 1423 87.9 kg (193 lb 12.6 oz)   10/25/24 0848 86.7 kg (191 lb 2.2 oz)   09/06/24 0827 85.9 kg (189 lb 6 oz)     Labs reviewed and are noted below.    Lab Results   Component Value Date    HGBA1C 8.1 (H) 10/25/2024    HGBA1C 7.7 (H) 07/19/2024    HGBA1C 12.4 (H) 04/19/2024     Lab Results   Component Value Date    WBC 4.46 04/19/2024    HGB 16.9 04/19/2024    HCT 50.8 04/19/2024     04/19/2024    CHOL 133 10/25/2024    TRIG 174 (H) 10/25/2024    HDL 36 (L) 10/25/2024    LDLCALC 62.2 (L) 10/25/2024    ALT 28 10/25/2024    AST 23 10/25/2024     10/25/2024    K 4.0 " "10/25/2024     10/25/2024    ANIONGAP 12 10/25/2024    CREATININE 0.9 10/25/2024    ESTGFRAFRICA >60.0 11/15/2019    EGFRNONAA >60.0 11/15/2019    BUN 17 10/25/2024    CO2 24 10/25/2024    TSH 0.971 04/19/2024    PSA 1.3 04/19/2024     (H) 10/25/2024    MICROALBUR 55.0 05/25/2018     Lab Results   Component Value Date    TSH 0.971 04/19/2024    CALCIUM 10.2 10/25/2024     No results found for: "CPEPTIDE"  No results found for: "GLUTAMICACID"  Glucose   Date Value Ref Range Status   10/25/2024 227 (H) 70 - 110 mg/dL Final     Anion Gap   Date Value Ref Range Status   10/25/2024 12 8 - 16 mmol/L Final     eGFR if    Date Value Ref Range Status   11/15/2019 >60.0 >60 mL/min/1.73 m^2 Final     eGFR if non    Date Value Ref Range Status   11/15/2019 >60.0 >60 mL/min/1.73 m^2 Final     Comment:     Calculation used to obtain the estimated glomerular filtration  rate (eGFR) is the CKD-EPI equation.              Review of patient's allergies indicates:   Allergen Reactions    Codeine      Social History     Socioeconomic History    Marital status: Legally    Tobacco Use    Smoking status: Never    Smokeless tobacco: Never   Substance and Sexual Activity    Alcohol use: Yes     Alcohol/week: 20.0 standard drinks of alcohol     Types: 20 Cans of beer per week     Past Medical History:   Diagnosis Date    Hyperlipidemia     Hypertension        Review of Systems   Constitutional:  Positive for malaise/fatigue.   Eyes:  Negative for blurred vision and double vision.   Respiratory:  Negative for shortness of breath.    Cardiovascular: Negative.    Gastrointestinal: Negative.    Genitourinary:  Negative for frequency.   Musculoskeletal:  Negative for myalgias.   Neurological: Negative.    Psychiatric/Behavioral: Negative.           Physical Exam  Vitals reviewed.   Constitutional:       Appearance: Normal appearance. He is obese.   Eyes:      Conjunctiva/sclera: Conjunctivae " "normal.      Pupils: Pupils are equal, round, and reactive to light.   Neck:      Thyroid: No thyroid mass, thyromegaly or thyroid tenderness.   Cardiovascular:      Rate and Rhythm: Normal rate and regular rhythm.      Pulses: Normal pulses.   Pulmonary:      Effort: Pulmonary effort is normal.   Musculoskeletal:      Right lower leg: No edema.      Left lower leg: No edema.   Skin:     General: Skin is warm and dry.   Neurological:      General: No focal deficit present.      Mental Status: He is alert and oriented to person, place, and time.      Comments:      Psychiatric:         Mood and Affect: Mood normal.         Behavior: Behavior normal.             Assessment & Plan    Type 2 diabetes mellitus without retinopathy  -     POCT Glucose, Hand-Held Device  -     Ambulatory referral/consult to Diabetes Education; Future; Expected date: 11/22/2024  Increase Lantus to 20 units at night  Increase Glipizide to 5 mg at breakfast and at supper  Metformin at breakfast and supper  To RD/DM ED for help with diet    Type 2 diabetes mellitus with hyperglycemia, with long-term current use of insulin    Hyperlipidemia associated with type 2 diabetes mellitus - on statin  -     Ambulatory referral/consult to Diabetes Education; Future; Expected date: 11/22/2024  -     Lower fat diet    Hypertension associated with diabetes - on arb  -     Ambulatory referral/consult to Diabetes Education; Future; Expected date: 11/22/2024    Alcohol abuse - enc to continue to decrease etoh intake    Other orders  -     pen needle, diabetic (BD FREDERIC 2ND GEN PEN NEEDLE) 32 gauge x 5/32" Ndle; 1 each by Misc.(Non-Drug; Combo Route) route once daily.  Dispense: 100 each; Refill: 3    - Reviewed with patient:  The basic pathophysiology of Type 2 diabetes  Mechanism of action and action time of medications  Use of home glucose monitor/cgm  Basic diet/carbohydrate counting/avoiding simple sugars/plate method  Proper hydration   Risk of " complications and preventive measures  When to call for assistance      - Follow up:  4 weeks    Visit today included increased complexity associated with the care of the episodic problem hyperglycemia addressed and managing the longitudinal care of the patient due to the serious and/or complex managed problem(s) Type 2 diabetes.

## 2024-11-19 ENCOUNTER — ANESTHESIA EVENT (OUTPATIENT)
Dept: ENDOSCOPY | Facility: HOSPITAL | Age: 64
End: 2024-11-19
Payer: COMMERCIAL

## 2024-11-19 NOTE — ANESTHESIA PREPROCEDURE EVALUATION
11/19/2024  Mikey Stinson is a 64 y.o., male.      Pre-op Assessment    I have reviewed the Patient Summary Reports.     I have reviewed the Nursing Notes. I have reviewed the NPO Status.   I have reviewed the Medications.     Review of Systems  Anesthesia Hx:  No problems with previous Anesthesia             Denies Family Hx of Anesthesia complications.    Denies Personal Hx of Anesthesia complications.                    Social:  Non-Smoker, Alcohol Use       Cardiovascular:     Hypertension           hyperlipidemia                               Hepatic/GI:  Bowel Prep.                   Endocrine:  Diabetes, type 2               Physical Exam  General: Well nourished, Cooperative, Alert and Oriented    Airway:  Mallampati: III   Mouth Opening: Small, but > 3cm  TM Distance: 4 - 6 cm  Tongue: Normal  Neck ROM: Normal ROM    Dental:  Intact        Anesthesia Plan  Type of Anesthesia, risks & benefits discussed:    Anesthesia Type: Gen Natural Airway  Intra-op Monitoring Plan: Standard ASA Monitors  Post Op Pain Control Plan: multimodal analgesia  Induction:  IV  Informed Consent: Informed consent signed with the Patient and all parties understand the risks and agree with anesthesia plan.  All questions answered. Patient consented to blood products? No  ASA Score: 2  Day of Surgery Review of History & Physical: H&P Update referred to the surgeon/provider.    Ready For Surgery From Anesthesia Perspective.     .

## 2024-11-22 ENCOUNTER — ANESTHESIA (OUTPATIENT)
Dept: ENDOSCOPY | Facility: HOSPITAL | Age: 64
End: 2024-11-22
Payer: COMMERCIAL

## 2024-11-22 ENCOUNTER — HOSPITAL ENCOUNTER (OUTPATIENT)
Facility: HOSPITAL | Age: 64
Discharge: HOME | End: 2024-11-22
Attending: INTERNAL MEDICINE | Admitting: INTERNAL MEDICINE
Payer: COMMERCIAL

## 2024-11-22 VITALS
HEART RATE: 74 BPM | TEMPERATURE: 97 F | SYSTOLIC BLOOD PRESSURE: 121 MMHG | BODY MASS INDEX: 27.54 KG/M2 | HEIGHT: 69 IN | RESPIRATION RATE: 20 BRPM | DIASTOLIC BLOOD PRESSURE: 65 MMHG | WEIGHT: 185.94 LBS | OXYGEN SATURATION: 98 %

## 2024-11-22 DIAGNOSIS — Z12.11 COLON CANCER SCREENING: ICD-10-CM

## 2024-11-22 DIAGNOSIS — E11.9 TYPE 2 DIABETES MELLITUS WITHOUT RETINOPATHY: ICD-10-CM

## 2024-11-22 LAB — POCT GLUCOSE: 124 MG/DL (ref 70–110)

## 2024-11-22 PROCEDURE — 88305 TISSUE EXAM BY PATHOLOGIST: CPT | Performed by: PATHOLOGY

## 2024-11-22 PROCEDURE — 82962 GLUCOSE BLOOD TEST: CPT | Performed by: INTERNAL MEDICINE

## 2024-11-22 PROCEDURE — 63600175 PHARM REV CODE 636 W HCPCS: Performed by: NURSE ANESTHETIST, CERTIFIED REGISTERED

## 2024-11-22 PROCEDURE — 45385 COLONOSCOPY W/LESION REMOVAL: CPT | Mod: 33 | Performed by: INTERNAL MEDICINE

## 2024-11-22 PROCEDURE — 37000009 HC ANESTHESIA EA ADD 15 MINS: Performed by: INTERNAL MEDICINE

## 2024-11-22 PROCEDURE — 25000003 PHARM REV CODE 250: Performed by: INTERNAL MEDICINE

## 2024-11-22 PROCEDURE — 45385 COLONOSCOPY W/LESION REMOVAL: CPT | Mod: 33,,, | Performed by: INTERNAL MEDICINE

## 2024-11-22 PROCEDURE — 27201089 HC SNARE, DISP (ANY): Performed by: INTERNAL MEDICINE

## 2024-11-22 PROCEDURE — 37000008 HC ANESTHESIA 1ST 15 MINUTES: Performed by: INTERNAL MEDICINE

## 2024-11-22 RX ORDER — DEXTROMETHORPHAN/PSEUDOEPHED 2.5-7.5/.8
DROPS ORAL
Status: DISCONTINUED | OUTPATIENT
Start: 2024-11-22 | End: 2024-11-22 | Stop reason: HOSPADM

## 2024-11-22 RX ORDER — LIDOCAINE HYDROCHLORIDE 20 MG/ML
INJECTION INTRAVENOUS
Status: DISCONTINUED | OUTPATIENT
Start: 2024-11-22 | End: 2024-11-22

## 2024-11-22 RX ORDER — PROPOFOL 10 MG/ML
VIAL (ML) INTRAVENOUS
Status: DISCONTINUED | OUTPATIENT
Start: 2024-11-22 | End: 2024-11-22

## 2024-11-22 RX ORDER — SODIUM CHLORIDE, SODIUM LACTATE, POTASSIUM CHLORIDE, CALCIUM CHLORIDE 600; 310; 30; 20 MG/100ML; MG/100ML; MG/100ML; MG/100ML
INJECTION, SOLUTION INTRAVENOUS CONTINUOUS PRN
Status: DISCONTINUED | OUTPATIENT
Start: 2024-11-22 | End: 2024-11-22

## 2024-11-22 RX ADMIN — SODIUM CHLORIDE, POTASSIUM CHLORIDE, SODIUM LACTATE AND CALCIUM CHLORIDE: 600; 310; 30; 20 INJECTION, SOLUTION INTRAVENOUS at 10:11

## 2024-11-22 RX ADMIN — LIDOCAINE HYDROCHLORIDE 50 MG: 20 INJECTION INTRAVENOUS at 10:11

## 2024-11-22 RX ADMIN — PROPOFOL 50 MG: 10 INJECTION, EMULSION INTRAVENOUS at 10:11

## 2024-11-22 RX ADMIN — PROPOFOL 25 MG: 10 INJECTION, EMULSION INTRAVENOUS at 10:11

## 2024-11-22 NOTE — H&P
Endoscopy History and Physical    PCP - Tila Chapa MD  Referring Physician - Tila Chapa MD  [id number] [facility]  [place] [id number]      ASA - per anesthesia  Mallampati - per anesthesia  History of Anesthesia problems - no  Family history Anesthesia problems -  no   Plan of anesthesia - General    HPI  64 y.o. male    Planned Procedure: Colonoscopy  Diagnosis: screening for colon cancer  Chief Complaint: Same as above    Personnel H/o colon polyps:index  FH of colon cancer:no  Anticoagulation:no      ROS:  Constitutional: No fevers, chills, No weight loss  CV: No chest pain  Pulm: No cough, No shortness of breath  GI: see HPI    Medical History:  has a past medical history of Hyperlipidemia and Hypertension.    Surgical History:  has a past surgical history that includes Hand surgery; Knee arthroscopy; and Chest exploration.    Family History: family history includes Diabetes in his mother; Lung cancer in his mother..    Social History:  reports that he has never smoked. He has never used smokeless tobacco. He reports current alcohol use of about 20.0 standard drinks of alcohol per week.    Review of patient's allergies indicates:   Allergen Reactions    Codeine        Medications:   Medications Prior to Admission   Medication Sig Dispense Refill Last Dose/Taking    fenofibrate (TRICOR) 54 MG tablet Take 1 tablet (54 mg total) by mouth once daily. 90 tablet 3 Past Week    glipiZIDE 5 MG TR24 Take 1 tablet (5 mg total) by mouth daily with breakfast. (Patient taking differently: Take 5 mg by mouth 2 (two) times a day.) 90 tablet 1 Past Week    insulin glargine U-100, Lantus, (LANTUS SOLOSTAR U-100 INSULIN) 100 unit/mL (3 mL) InPn pen Inject 15 Units into the skin every evening. 3 mL 1 11/21/2024    metFORMIN (GLUCOPHAGE) 1000 MG tablet Take 1 tablet (1,000 mg total) by mouth 2 (two) times daily with meals. 180 tablet 1 Past Week    olmesartan (BENICAR) 20 MG tablet Take 1 tablet (20 mg total) by mouth  "once daily. 90 tablet 1 Past Week    pen needle, diabetic (BD FREDERIC 2ND GEN PEN NEEDLE) 32 gauge x 5/32" Ndle 1 each by Misc.(Non-Drug; Combo Route) route once daily. 100 each 3 Past Week    rosuvastatin (CRESTOR) 20 MG tablet Take 1 tablet (20 mg total) by mouth every evening. 90 tablet 1 Past Week    tadalafil (CIALIS) 20 MG Tab Use one tablet every 36 hours 10 tablet 11 Past Week    aspirin (ECOTRIN) 81 MG EC tablet Take 1 tablet (81 mg total) by mouth once daily.  0     empagliflozin (JARDIANCE) 25 mg tablet Take 1 tablet (25 mg total) by mouth once daily. 9 tablet 1 Unknown    latanoprost 0.005 % ophthalmic solution Place 1 drop into both eyes every evening.          Physical Exam:    Vital Signs:   Vitals:    11/22/24 0941   BP: (!) 143/80   Pulse: 65   Resp: 20   Temp: 97.3 °F (36.3 °C)       General Appearance: Well appearing in no acute distress  Abdomen: Soft, non tender, non distended with normal bowel sounds, no masses    Labs:  Lab Results   Component Value Date    WBC 4.46 04/19/2024    HGB 16.9 04/19/2024    HCT 50.8 04/19/2024     04/19/2024    CHOL 133 10/25/2024    TRIG 174 (H) 10/25/2024    HDL 36 (L) 10/25/2024    ALT 28 10/25/2024    AST 23 10/25/2024     10/25/2024    K 4.0 10/25/2024     10/25/2024    CREATININE 0.9 10/25/2024    BUN 17 10/25/2024    CO2 24 10/25/2024    TSH 0.971 04/19/2024    PSA 1.3 04/19/2024    HGBA1C 8.1 (H) 10/25/2024    MICROALBUR 55.0 05/25/2018       I have explained the risks and benefits of this endoscopic procedure to the patient including but not limited to bleeding, inflammation, infection, perforation, and death.    SEDATION PLAN: per anesthesia       History reviewed, vital signs satisfactory, cardiopulmonary status satisfactory, sedation options, risks and plans have been discussed with the patient  All their questions were answered and the patient agrees to the sedation procedures as planned and the patient is deemed an appropriate " candidate for the sedation as planned.     The risks, benefits and alternatives of the procedure were discussed with the patient in detail. This discussion was had in the presence of endoscopy staff. The risks include, risks of adverse reaction to sedation requiring the use of reversal agents, bleeding requiring blood transfusion, perforation requiring surgical intervention and technical failure. Other risks include aspiration leading to respiratory distress and respiratory failure resulting in endotracheal intubation and mechanical ventilation including death. If anesthesia is being utilized for this procedure, it is up to the anesthesiologist to determine airway safety including elective endotracheal intubation. Questions were answered, they agree to proceed. There was no language barriers.       Procedure explained to patient, informed consent obtained and placed in chart.       Dante Alas MD

## 2024-11-22 NOTE — ANESTHESIA POSTPROCEDURE EVALUATION
Anesthesia Post Evaluation    Patient: Mikey Stinson    Procedure(s) Performed: Procedure(s) (LRB):  COLONOSCOPY, SCREENING, LOW RISK PATIENT (N/A)    Final Anesthesia Type: general      Patient location during evaluation: PACU  Patient participation: Yes- Able to Participate  Level of consciousness: awake  Post-procedure vital signs: reviewed and stable  Pain management: adequate  Airway patency: patent    PONV status at discharge: No PONV  Anesthetic complications: no      Cardiovascular status: stable  Respiratory status: unassisted  Hydration status: euvolemic  Follow-up not needed.              Vitals Value Taken Time   /65 11/22/24 1102   Temp 36.2 °C (97.2 °F) 11/22/24 1053   Pulse 60 11/22/24 1102   Resp 14 11/22/24 1102   SpO2 98 % 11/22/24 1102   Vitals shown include unfiled device data.      No case tracking events are documented in the log.      Pain/Nestor Score: Nestor Score: 5 (11/22/2024 10:53 AM)

## 2024-11-22 NOTE — DISCHARGE SUMMARY
[facility]  Discharge Note  Short Stay    Procedure(s) (LRB):  COLONOSCOPY, SCREENING, LOW RISK PATIENT (N/A)      OUTCOME: Patient tolerated treatment/procedure well without complication and is now ready for discharge.    DISPOSITION: Home or Self Care    FINAL DIAGNOSIS:  Colon cancer screening    FOLLOWUP: With primary care provider    DISCHARGE INSTRUCTIONS:  No discharge procedures on file.      Clinical Reference Documents Added to Patient Instructions         Document    COLON POLYPECTOMY DISCHARGE INSTRUCTIONS (ENGLISH)            TIME SPENT ON DISCHARGE: 20 minutes

## 2024-11-22 NOTE — TRANSFER OF CARE
"Anesthesia Transfer of Care Note    Patient: Mikey Stinson    Procedure(s) Performed: Procedure(s) (LRB):  COLONOSCOPY, SCREENING, LOW RISK PATIENT (N/A)    Patient location: PACU    Anesthesia Type: general    Transport from OR: Transported from OR on room air with adequate spontaneous ventilation    Post pain: adequate analgesia    Post assessment: no apparent anesthetic complications    Post vital signs: stable    Level of consciousness: awake    Nausea/Vomiting: no nausea/vomiting    Complications: none    Transfer of care protocol was followed      Last vitals: Visit Vitals  BP 95/61 (BP Location: Left arm, Patient Position: Lying)   Pulse 65   Temp 36.2 °C (97.2 °F) (Temporal)   Resp 15   Ht 5' 9" (1.753 m)   Wt 84.4 kg (185 lb 15.3 oz)   SpO2 98%   BMI 27.46 kg/m²     "

## 2024-11-22 NOTE — PROVATION PATIENT INSTRUCTIONS
Discharge Summary/Instructions after an Endoscopic Procedure  Patient Name: Mikey Stinson  Patient MRN: [id number]  Patient YOB: 1960 Friday, November 22, 2024  Dante Alas MD  Dear patient,  As a result of recent federal legislation (The Federal Cures Act), you may   receive lab or pathology results from your procedure in your [facility]   account before your physician is able to contact you. Your physician or   their representative will relay the results to you with their   recommendations at their soonest availability.  Thank you,  RESTRICTIONS:  During your procedure today, you received medications for sedation.  These   medications may affect your judgment, balance and coordination.  Therefore,   for 24 hours, you have the following restrictions:   - DO NOT drive a car, operate machinery, make legal/financial decisions,   sign important papers or drink alcohol.    ACTIVITY:  Today: no heavy lifting, straining or running due to procedural   sedation/anesthesia.  The following day: return to full activity including work.  DIET:  Eat and drink normally unless instructed otherwise.     TREATMENT FOR COMMON SIDE EFFECTS:  - Mild abdominal pain, nausea, belching, bloating or excessive gas:  rest,   eat lightly and use a heating pad.  - Sore Throat: treat with throat lozenges and/or gargle with warm salt   water.  - Because air was used during the procedure, expelling large amounts of air   from your rectum or belching is normal.  - If a bowel prep was taken, you may not have a bowel movement for 1-3 days.    This is normal.  SYMPTOMS TO WATCH FOR AND REPORT TO YOUR PHYSICIAN:  1. Abdominal pain or bloating, other than gas cramps.  2. Chest pain.  3. Back pain.  4. Signs of infection such as: chills or fever occurring within 24 hours   after the procedure.  5. Rectal bleeding, which would show as bright red, maroon, or black stools.   (A tablespoon of blood from the rectum is not serious, especially  if   hemorrhoids are present.)  6. Vomiting.  7. Weakness or dizziness.  GO DIRECTLY TO THE NEAREST EMERGENCY ROOM IF YOU HAVE ANY OF THE FOLLOWING:      Difficulty breathing              Chills and/or fever over 101 F   Persistent vomiting and/or vomiting blood   Severe abdominal pain   Severe chest pain   Black, tarry stools   Bleeding- more than one tablespoon   Any other symptom or condition that you feel may need urgent attention  Your doctor recommends these additional instructions:  If any biopsies were taken, your doctors clinic will contact you in 1 to 2   weeks with any results.  - Discharge patient to home.   - Resume previous diet.   - Continue present medications.   - Await pathology results.   - Repeat colonoscopy in 5 years for surveillance based on pathology results.     - Return to referring physician as previously scheduled.   - Patient has a contact number available for emergencies.  The signs and   symptoms of potential delayed complications were discussed with the   patient.  Return to normal activities tomorrow.  Written discharge   instructions were provided to the patient.  For questions, problems or results please call your physician Dante Alas MD at Work:  [phone]  If you have any questions about the above instructions, call the GI   department at [phone] or call the endoscopy unit at [phone]   from 7am until 3 pm.  [facility] [place], EMERGENCY ROOM PHONE NUMBER:   [phone]  IF A COMPLICATION OR EMERGENCY SITUATION ARISES AND YOU ARE UNABLE TO REACH   YOUR PHYSICIAN - GO DIRECTLY TO THE EMERGENCY ROOM.  I have read or have had read to me these discharge instructions for my   procedure and have received a written copy.  I understand these   instructions and will follow-up with my physician if I have any questions.     __________________________________       _____________________________________  Nurse Signature                                           Patient/Designated   Responsible Party Signature  MD Dante Perez MD  11/22/2024 10:54:10 AM  This report has been verified and signed electronically.  Dear patient,  As a result of recent federal legislation (The Federal Cures Act), you may   receive lab or pathology results from your procedure in your [facility]   account before your physician is able to contact you. Your physician or   their representative will relay the results to you with their   recommendations at their soonest availability.  Thank you,  PROVATION

## 2024-11-26 LAB
FINAL PATHOLOGIC DIAGNOSIS: NORMAL
GROSS: NORMAL
Lab: NORMAL

## 2024-11-29 ENCOUNTER — CLINICAL SUPPORT (OUTPATIENT)
Dept: DIABETES | Facility: CLINIC | Age: 64
End: 2024-11-29
Payer: COMMERCIAL

## 2024-11-29 VITALS — WEIGHT: 191.38 LBS | BODY MASS INDEX: 28.26 KG/M2

## 2024-11-29 DIAGNOSIS — E11.65 TYPE 2 DIABETES MELLITUS WITH HYPERGLYCEMIA, WITH LONG-TERM CURRENT USE OF INSULIN: ICD-10-CM

## 2024-11-29 DIAGNOSIS — E11.69 HYPERLIPIDEMIA ASSOCIATED WITH TYPE 2 DIABETES MELLITUS: ICD-10-CM

## 2024-11-29 DIAGNOSIS — E78.5 HYPERLIPIDEMIA ASSOCIATED WITH TYPE 2 DIABETES MELLITUS: ICD-10-CM

## 2024-11-29 DIAGNOSIS — E11.9 TYPE 2 DIABETES MELLITUS WITHOUT RETINOPATHY: ICD-10-CM

## 2024-11-29 DIAGNOSIS — E11.59 HYPERTENSION ASSOCIATED WITH DIABETES: ICD-10-CM

## 2024-11-29 DIAGNOSIS — Z79.4 TYPE 2 DIABETES MELLITUS WITH HYPERGLYCEMIA, WITH LONG-TERM CURRENT USE OF INSULIN: ICD-10-CM

## 2024-11-29 DIAGNOSIS — I15.2 HYPERTENSION ASSOCIATED WITH DIABETES: ICD-10-CM

## 2024-11-29 PROCEDURE — 99999 PR PBB SHADOW E&M-EST. PATIENT-LVL III: CPT | Mod: PBBFAC,,,

## 2024-11-29 RX ORDER — INSULIN GLARGINE 100 [IU]/ML
INJECTION, SOLUTION SUBCUTANEOUS
Qty: 15 ML | Refills: 1 | Status: SHIPPED | OUTPATIENT
Start: 2024-11-29

## 2024-11-29 RX ORDER — GLIPIZIDE 5 MG/1
5 TABLET, FILM COATED, EXTENDED RELEASE ORAL 2 TIMES DAILY
Qty: 180 TABLET | Refills: 1 | Status: SHIPPED | OUTPATIENT
Start: 2024-11-29 | End: 2025-11-29

## 2024-11-29 NOTE — PROGRESS NOTES
Diabetes Care Specialist Progress Note  Author: Shanika Webb RN  Date: 11/29/2024    Intake    Program Intake  Reason for Diabetes Program Visit:: Initial Diabetes Assessment  Current diabetes risk level:: low  In the last month, have you used the ER or been admitted to the hospital: No  Permission to speak with others about care:: yes    Current Diabetes Treatment: Oral Medications, Insulin  Oral Medication Type/Dose: Glipizide 5 mg BID. Metformin 1,000 mg BID.  Method of insulin delivery?: Injections  Injection Type: Pens  Pen Type/Dose: Lantus 20 units daily.    Continuous Glucose Monitoring  Patient has CGM: No    Lab Results   Component Value Date    HGBA1C 8.1 (H) 10/25/2024       Weight: 86.8 kg (191 lb 5.8 oz)   Body mass index is 28.26 kg/m².    Lifestyle Coping Support & Clinical    Lifestyle/Coping/Support  Compared to other people your age, how would you rate your health?: Good  Does anyone in your family have diabetes or does anyone in your family support you in your diabetes care?: Family hx of DM. Wife present and supportive.  List anything about Diabetes that causes you stress?: None.  How do you deal with stress/distress?: N/A.  Learning Barriers:: Visual  Culture or Zoroastrian beliefs that may impact ability to access healthcare: No  Psychosocial/Coping Skills Assessment Completed: : Yes  Assessment indicates:: Adequate understanding  Area of need?: No    Problem Review  Active Comorbidities: Hyperlipidemia/Dyslipidemia    Diabetes Self-Management Skills Assessment    Medication Skills Assessment  Patient is able to identify current diabetes medications, dosages, and appropriate timing of medications.: yes  Patient reports problems or concerns with current medication regimen.: yes  Medication regimen problems/concerns:: other (see comments) (Needs refill.)  Patient is  aware that some diabetes medications can cause low blood sugar?: No  Medication Skills Assessment Completed:: Yes  Assessment  indicates:: Knowledge deficit  Area of need?: Yes    Diabetes Disease Process/Treatment Options  Diabetes Type?: Type II  When were you diagnosed?: .  If previous diabetes education, when/where:: None.  What are your goals for this education session?: Learn more about diabetes.  Is patient aware of what causes diabetes?: No  Does patient understand the pathophysiology of diabetes?: No  Diabetes Disease Process/Treatment Options: Skills Assessment Completed: Yes  Assessment indicates:: Knowledge deficit  Area of need?: Yes    Nutrition/Healthy Eating  Meal Plan 24 Hour Recall - Breakfast: None. (Sometimes a PB&J sandwich).  Meal Plan 24 Hour Recall - Lunch: Mashed potatoes, cornbread dressing, and turkey.  Meal Plan 24 Hour Recall - Dinner: Turkey sandwich and dressing. (Usually pizza, red beans, steak, jambalya, spaghetti).  Meal Plan 24 Hour Recall - Snack: 3 mini Snickers. 1 piece of chocolate pudding pie.  Meal Plan 24 Hour Recall - Beverage: Water. Trying to switch to SF cold drinks.  Who shops/cooks?: Wife.  Patient can identify foods that impact blood sugar.: yes (Potatoes.)  Challenges to healthy eating:: portion control  Nutrition/Healthy Eating Skills Assessment Completed:: Yes  Assessment indicates:: Knowledge deficit, Instruction Needed  Area of need?: Yes    Physical Activity/Exercise  Physical Activity/Exercise Skills Assessment Completed: : No  Deffered due to:: Time  Area of need?: Deferred    Home Blood Glucose Monitoring  Patient states that blood sugar is checked at home daily.: yes  Monitoring Method:: home glucometer  Fasting BG range history:: 142 mg/dL.  What are your blood glucose targets?: FB-130 mg/dL.  How often do you check your blood sugar?: 1x/day.  What is your A1c Target?: <7%.  Home Blood Glucose Monitoring Skills Assessment Completed: : Yes  Assessment indicates:: Knowledge deficit  Area of need?: Yes    Acute Complications  Have you ever had hypoglycemia (low BG 70 or  less)?: yes  How often and what are your symptoms?: Rare. Shaky.  How do you treat hypoglycemia?: Fast-acting sugar.  Have you ever had hyperglycemia (high  or more)?: no  Have you ever had DKA?: no  Do you ever test for ketones?: no  Do you have a sick day plan?: no  Acute Complications Skills Assessment Completed: : Yes  Assessment indicates:: Knowledge deficit, Instruction Needed  Area of need?: Yes    Chronic Complications  Chronic Complications Skills Assessment Completed: : No  Deferred due to:: Time  Area of need?: Deferred      Assessment Summary and Plan    Based on today's diabetes care assessment, the following areas of need were identified:      Identified Areas of Need      Medication/Current Diabetes Treatment: Yes-Discussed MOA, onset, side effects, dosage of Lantus, Glipizide, and Metformin.      Lifestyle Coping/Support: No-Wife present and supportive.      Diabetes Disease Process/Treatment Options: Yes-Educated on patho of Type 2 diabetes.     Nutrition/Healthy Eating: Yes-See care plan.     Physical Activity/Exercise: Deferred.      Home Blood Glucose Monitoring: Yes-Checking blood sugars 1x/day. Educated to keep log and bring to follow-up visits. Educated on FBG goal of  mg/dL.      Acute Complications: Yes-Reviewed blood glucose goals, prevention, detection, signs and symptoms, and treatment of hypoglycemia, and when to contact the clinic.     Chronic Complications: Deferred.         Today's interventions were provided through individual discussion, instruction, and written materials were provided.      Patient verbalized understanding of instruction and written materials.  Pt was able to return back demonstration of instructions today. Patient understood key points, needs reinforcement and further instruction.     Diabetes Self-Management Care Plan:    Today's Diabetes Self-Management Care Plan was developed with Mikey's input. Mikey has agreed to work toward the following  goal(s) to improve his/her overall diabetes control.      Care Plan: Diabetes Management   Updates made since 11/30/2023 12:00 AM        Problem: Healthy Eating         Goal: Eat 3 meals daily with 45-60 grams of carbs/meal and 0-15 grams of carbs/snack.    Start Date: 11/29/2024   Expected End Date: 11/15/2025        Task: Reviewed the sources and role of Carbohydrate, Protein, and Fat and how each nutrient impacts blood sugar. Completed 11/29/2024        Task: Provided visual examples using dry measuring cups, food models, and other familiar objects such as computer mouse, deck or cards, tennis ball etc. to help with visualization of portions. Completed 11/29/2024        Task: Explained how to count carbohydrates using the food label and the use of dry measuring cups for accurate carb counting. Completed 11/29/2024        Task: Recommended replacing beverages containing high sugar content with noncaloric/sugar free options and/or water. Completed 11/29/2024        Task: Review the importance of balancing carbohydrates with each meal using portion control techniques to count servings of carbohydrate and label reading to identify serving size and amount of total carbs per serving. Completed 11/29/2024        Task: Provided Sample plate method and reviewed the use of the plate to estimate amounts of carbohydrate per meal. Completed 11/29/2024          Follow Up Plan     Follow up in about 1 month (around 12/29/2024) for completion of assessment and review of goals..    Today's care plan and follow up schedule was discussed with patient.  Mikey verbalized understanding of the care plan, goals, and agrees to follow up plan.        The patient was encouraged to communicate with his/her health care provider/physician and care team regarding his/her condition(s) and treatment.  I provided the patient with my contact information today and encouraged to contact me via phone or [facility] Portal as needed.     Length  of Visit   Total Time: 60 Minutes

## 2024-11-29 NOTE — Clinical Note
Hi, I met with Mr. Stinson this morning. He is out of his Lantus and Glipizide. It looks like the prescription needs to be updated to 20 units of Lantus and Glipizide BID. Please assist. Thank you, RONAN Voss

## 2025-01-10 DIAGNOSIS — E11.59 HYPERTENSION ASSOCIATED WITH DIABETES: ICD-10-CM

## 2025-01-10 DIAGNOSIS — E78.5 HYPERLIPIDEMIA ASSOCIATED WITH TYPE 2 DIABETES MELLITUS: ICD-10-CM

## 2025-01-10 DIAGNOSIS — E11.9 TYPE 2 DIABETES MELLITUS WITHOUT RETINOPATHY: ICD-10-CM

## 2025-01-10 DIAGNOSIS — E11.69 HYPERLIPIDEMIA ASSOCIATED WITH TYPE 2 DIABETES MELLITUS: ICD-10-CM

## 2025-01-10 DIAGNOSIS — I15.2 HYPERTENSION ASSOCIATED WITH DIABETES: ICD-10-CM

## 2025-01-10 RX ORDER — OLMESARTAN MEDOXOMIL 20 MG/1
20 TABLET ORAL DAILY
Qty: 90 TABLET | Refills: 0 | Status: SHIPPED | OUTPATIENT
Start: 2025-01-10

## 2025-01-10 RX ORDER — METFORMIN HYDROCHLORIDE 1000 MG/1
1000 TABLET ORAL 2 TIMES DAILY WITH MEALS
Qty: 180 TABLET | Refills: 0 | Status: SHIPPED | OUTPATIENT
Start: 2025-01-10

## 2025-01-10 RX ORDER — ROSUVASTATIN CALCIUM 20 MG/1
20 TABLET, COATED ORAL NIGHTLY
Qty: 90 TABLET | Refills: 0 | Status: SHIPPED | OUTPATIENT
Start: 2025-01-10

## 2025-01-31 ENCOUNTER — LAB VISIT (OUTPATIENT)
Dept: LAB | Facility: HOSPITAL | Age: 65
End: 2025-01-31
Attending: NURSE PRACTITIONER
Payer: COMMERCIAL

## 2025-01-31 ENCOUNTER — OFFICE VISIT (OUTPATIENT)
Dept: DIABETES | Facility: CLINIC | Age: 65
End: 2025-01-31
Payer: COMMERCIAL

## 2025-01-31 VITALS
BODY MASS INDEX: 29.14 KG/M2 | WEIGHT: 197.31 LBS | HEART RATE: 68 BPM | DIASTOLIC BLOOD PRESSURE: 89 MMHG | SYSTOLIC BLOOD PRESSURE: 135 MMHG

## 2025-01-31 DIAGNOSIS — E11.65 TYPE 2 DIABETES MELLITUS WITH HYPERGLYCEMIA, WITH LONG-TERM CURRENT USE OF INSULIN: ICD-10-CM

## 2025-01-31 DIAGNOSIS — Z79.4 TYPE 2 DIABETES MELLITUS WITH HYPERGLYCEMIA, WITH LONG-TERM CURRENT USE OF INSULIN: ICD-10-CM

## 2025-01-31 DIAGNOSIS — I15.2 HYPERTENSION ASSOCIATED WITH DIABETES: ICD-10-CM

## 2025-01-31 DIAGNOSIS — E11.9 TYPE 2 DIABETES MELLITUS WITHOUT RETINOPATHY: ICD-10-CM

## 2025-01-31 DIAGNOSIS — E11.9 TYPE 2 DIABETES MELLITUS WITHOUT RETINOPATHY: Primary | ICD-10-CM

## 2025-01-31 DIAGNOSIS — E11.69 HYPERLIPIDEMIA ASSOCIATED WITH TYPE 2 DIABETES MELLITUS: ICD-10-CM

## 2025-01-31 DIAGNOSIS — E11.59 HYPERTENSION ASSOCIATED WITH DIABETES: ICD-10-CM

## 2025-01-31 DIAGNOSIS — E78.5 HYPERLIPIDEMIA ASSOCIATED WITH TYPE 2 DIABETES MELLITUS: ICD-10-CM

## 2025-01-31 LAB — GLUCOSE SERPL-MCNC: 142 MG/DL (ref 70–110)

## 2025-01-31 PROCEDURE — 1159F MED LIST DOCD IN RCRD: CPT | Mod: CPTII,S$GLB,, | Performed by: NURSE PRACTITIONER

## 2025-01-31 PROCEDURE — 99214 OFFICE O/P EST MOD 30 MIN: CPT | Mod: S$GLB,,, | Performed by: NURSE PRACTITIONER

## 2025-01-31 PROCEDURE — 3079F DIAST BP 80-89 MM HG: CPT | Mod: CPTII,S$GLB,, | Performed by: NURSE PRACTITIONER

## 2025-01-31 PROCEDURE — 3075F SYST BP GE 130 - 139MM HG: CPT | Mod: CPTII,S$GLB,, | Performed by: NURSE PRACTITIONER

## 2025-01-31 PROCEDURE — 4010F ACE/ARB THERAPY RXD/TAKEN: CPT | Mod: CPTII,S$GLB,, | Performed by: NURSE PRACTITIONER

## 2025-01-31 PROCEDURE — G2211 COMPLEX E/M VISIT ADD ON: HCPCS | Mod: S$GLB,,, | Performed by: NURSE PRACTITIONER

## 2025-01-31 PROCEDURE — 99999 PR PBB SHADOW E&M-EST. PATIENT-LVL IV: CPT | Mod: PBBFAC,,, | Performed by: NURSE PRACTITIONER

## 2025-01-31 PROCEDURE — 82962 GLUCOSE BLOOD TEST: CPT | Mod: S$GLB,,, | Performed by: NURSE PRACTITIONER

## 2025-01-31 PROCEDURE — 1160F RVW MEDS BY RX/DR IN RCRD: CPT | Mod: CPTII,S$GLB,, | Performed by: NURSE PRACTITIONER

## 2025-01-31 PROCEDURE — 83036 HEMOGLOBIN GLYCOSYLATED A1C: CPT | Performed by: NURSE PRACTITIONER

## 2025-01-31 PROCEDURE — 3008F BODY MASS INDEX DOCD: CPT | Mod: CPTII,S$GLB,, | Performed by: NURSE PRACTITIONER

## 2025-01-31 PROCEDURE — 36415 COLL VENOUS BLD VENIPUNCTURE: CPT | Performed by: NURSE PRACTITIONER

## 2025-01-31 RX ORDER — CEPHALEXIN 500 MG/1
500 CAPSULE ORAL 2 TIMES DAILY
COMMUNITY
Start: 2025-01-15

## 2025-01-31 RX ORDER — INSULIN GLARGINE 100 [IU]/ML
INJECTION, SOLUTION SUBCUTANEOUS
Qty: 15 ML | Refills: 3 | Status: SHIPPED | OUTPATIENT
Start: 2025-01-31

## 2025-01-31 RX ORDER — GLIPIZIDE 5 MG/1
5 TABLET, FILM COATED, EXTENDED RELEASE ORAL 2 TIMES DAILY
Qty: 180 TABLET | Refills: 1 | Status: SHIPPED | OUTPATIENT
Start: 2025-01-31 | End: 2026-01-31

## 2025-01-31 NOTE — PROGRESS NOTES
Patient ID: Mikey Stinson is a 64 y.o. male.  Patient's current PCP is Tila Chapa MD.     Chief Complaint: Diabetes Mellitus    HPI  Mikey Stinson is a 64 y.o. White male presenting for a follow up for diabetes.     Patient has been diagnosed with type 2 diabetes for < 5 years.  History of diabetes related hospitalizations:None  Pertinent to decision making is/are the following comorbidities:HLD, HTN. Alcohol abuse  Complications related to diabetes: none  Previous diabetes education:yes with DX    Current diet: Trying to eat better. Still overeating carbs at some meals with resultant hyperglycemia. Decreased to 5 beers nightly.Working with RD  Activity Level: Work related - active at home  Occupation:/heavy equipment, . Working previously in [employer]    Changes made at last visit:  Increase Lantus to 20 units at night  Increase Glipizide to 5 mg at breakfast and at supper  Metformin at breakfast and supper  To RD/DM ED for help with diet    Current issues:  Cost of medications an issue - has not started Jardiance    Personal history of pancreatitis: denies  Family history of pancreatic cancer in first-degree relative: denies  Family history of MTC/MEN/endocrine tumors: denies     CURRENT DM MEDICATIONS:   Diabetes Medications               metFORMIN (GLUCOPHAGE) 1000 MG tablet Take 1 tablet (1,000 mg total) by mouth 2 (two) times daily with meals.    glipiZIDE 5 MG TR24 Take 1 tablet (5 mg total) by mouth 2 (two) times a day.    insulin glargine U-100, Lantus, (LANTUS SOLOSTAR U-100 INSULIN) 100 unit/mL (3 mL) InPn pen Inject up to 20 units daily       Past failed treatment(s) include:   Glipizide - hypoglycemia  Actos  Janumet      Meter/cgm: meter  Blood glucose testing is performed regularly.   Patient is testing 3 times per week.  Any episodes of hypoglycemia? None known  Glucose trends:Reports 160-177s fasting and 220's 1 hour pp supper. Waking up high      His blood sugar in the clinic  "today was: Large/high carb meal  Lab Results   Component Value Date    POCGLU 142 (A) 01/31/2025       Diabetes Management Status    Statin: Taking  ACE/ARB: Taking    Screening or Prevention Patient's value Goal Complete/Controlled?   HgA1C Testing and Control   Lab Results   Component Value Date    HGBA1C 8.1 (H) 10/25/2024      Annually/Less than 8% No   Lipid profile : 10/25/2024 Annually Yes   LDL control Lab Results   Component Value Date    LDLCALC 62.2 (L) 10/25/2024    Annually/Less than 100 mg/dl  yes   Nephropathy screening Lab Results   Component Value Date    LABMICR 55.0 04/19/2024     No results found for: "PROTEINUA"  No results found for: "UTPCR"   Annually Yes   Blood pressure BP Readings from Last 1 Encounters:   01/31/25 135/89    Less than 140/90 Yes   Dilated retinal exam : 11/15/2019 due Annually No   Foot exam   7/19/24 Annually YES     Preferred lab site:none  Preferred visit site:none    Wt Readings from Last 3 Encounters:   01/31/25 1527 89.5 kg (197 lb 5 oz)   11/29/24 0823 86.8 kg (191 lb 5.8 oz)   11/22/24 0941 84.4 kg (185 lb 15.3 oz)     Labs reviewed and are noted below.    Lab Results   Component Value Date    HGBA1C 8.1 (H) 10/25/2024    HGBA1C 7.7 (H) 07/19/2024    HGBA1C 12.4 (H) 04/19/2024     Lab Results   Component Value Date    WBC 4.46 04/19/2024    HGB 16.9 04/19/2024    HCT 50.8 04/19/2024     04/19/2024    CHOL 133 10/25/2024    TRIG 174 (H) 10/25/2024    HDL 36 (L) 10/25/2024    LDLCALC 62.2 (L) 10/25/2024    ALT 28 10/25/2024    AST 23 10/25/2024     10/25/2024    K 4.0 10/25/2024     10/25/2024    ANIONGAP 12 10/25/2024    CREATININE 0.9 10/25/2024    ESTGFRAFRICA >60.0 11/15/2019    EGFRNONAA >60.0 11/15/2019    BUN 17 10/25/2024    CO2 24 10/25/2024    TSH 0.971 04/19/2024    PSA 1.3 04/19/2024     (H) 10/25/2024    MICROALBUR 55.0 05/25/2018     Lab Results   Component Value Date    TSH 0.971 04/19/2024    CALCIUM 10.2 10/25/2024     No " "results found for: "CPEPTIDE"  No results found for: "GLUTAMICACID"  Glucose   Date Value Ref Range Status   10/25/2024 227 (H) 70 - 110 mg/dL Final     Anion Gap   Date Value Ref Range Status   10/25/2024 12 8 - 16 mmol/L Final     eGFR if    Date Value Ref Range Status   11/15/2019 >60.0 >60 mL/min/1.73 m^2 Final     eGFR if non    Date Value Ref Range Status   11/15/2019 >60.0 >60 mL/min/1.73 m^2 Final     Comment:     Calculation used to obtain the estimated glomerular filtration  rate (eGFR) is the CKD-EPI equation.              Review of patient's allergies indicates:   Allergen Reactions    Codeine      Social History     Socioeconomic History    Marital status: Legally    Tobacco Use    Smoking status: Never    Smokeless tobacco: Never   Substance and Sexual Activity    Alcohol use: Yes     Alcohol/week: 20.0 standard drinks of alcohol     Types: 20 Cans of beer per week     Past Medical History:   Diagnosis Date    Hyperlipidemia     Hypertension        Review of Systems   Constitutional:  Negative for malaise/fatigue and weight loss.   Eyes:  Negative for blurred vision and double vision.   Respiratory:  Negative for shortness of breath.    Cardiovascular: Negative.    Gastrointestinal: Negative.    Genitourinary:  Negative for frequency.   Musculoskeletal:  Negative for myalgias.   Neurological: Negative.    Psychiatric/Behavioral: Negative.           Physical Exam  Vitals reviewed.   Constitutional:       Appearance: Normal appearance. He is obese.   Eyes:      Conjunctiva/sclera: Conjunctivae normal.      Pupils: Pupils are equal, round, and reactive to light.   Neck:      Thyroid: No thyroid mass, thyromegaly or thyroid tenderness.   Cardiovascular:      Rate and Rhythm: Normal rate and regular rhythm.      Pulses: Normal pulses.   Pulmonary:      Effort: Pulmonary effort is normal.   Musculoskeletal:      Right lower leg: No edema.      Left lower leg: No " edema.   Skin:     General: Skin is warm and dry.   Neurological:      General: No focal deficit present.      Mental Status: He is alert and oriented to person, place, and time.      Comments:      Psychiatric:         Mood and Affect: Mood normal.         Behavior: Behavior normal.             Assessment & Plan    Type 2 diabetes mellitus without retinopathy  -     POCT Glucose, Hand-Held Device  -     glipiZIDE 5 MG TR24; Take 1 tablet (5 mg total) by mouth 2 (two) times a day.  Dispense: 180 tablet; Refill: 1  -     insulin glargine U-100, Lantus, (LANTUS SOLOSTAR U-100 INSULIN) 100 unit/mL (3 mL) InPn pen; Inject up to 50 units daily  Dispense: 15 mL; Refill: 3  -     Hemoglobin A1C; Future; Expected date: 01/31/2025  -     Increase Lantus to 22 units at night  -     $250 deductible for rxs - to call when met and will send in Jardiance rx  -     Websites for medication coupons given    Type 2 diabetes mellitus with hyperglycemia, with long-term current use of insulin  -     POCT Glucose, Hand-Held Device  -     glipiZIDE 5 MG TR24; Take 1 tablet (5 mg total) by mouth 2 (two) times a day.  Dispense: 180 tablet; Refill: 1  -     insulin glargine U-100, Lantus, (LANTUS SOLOSTAR U-100 INSULIN) 100 unit/mL (3 mL) InPn pen; Inject up to 50 units daily  Dispense: 15 mL; Refill: 3  -     Hemoglobin A1C; Future; Expected date: 01/31/2025    Hyperlipidemia associated with type 2 diabetes mellitus- on statin  -     POCT Glucose, Hand-Held Device    Hypertension associated with diabetes - on arb      - Reviewed with patient:  The basic pathophysiology of Type 2 diabetes  Mechanism of action and action time of medications  Use of home glucose monitor/cgm  Basic diet/carbohydrate counting/avoiding simple sugars/plate method  Proper hydration   Risk of complications and preventive measures  When to call for assistance      - Follow up: 3 months    Visit today included increased complexity associated with the care of the  episodic problem hyperglycemia addressed and managing the longitudinal care of the patient due to the serious and/or complex managed problem(s) Type 2 diabetes.

## 2025-01-31 NOTE — PATIENT INSTRUCTIONS
Increase Lantus to 22 units at night    Check [email or web address] for savings card ($35/month)  Check [email or web address] for savings card    Let me know once deductible is met and I will send in Jardiance

## 2025-02-01 LAB
ESTIMATED AVG GLUCOSE: 148 MG/DL (ref 68–131)
HBA1C MFR BLD: 6.8 % (ref 4–5.6)

## 2025-03-13 LAB
LEFT EYE DM RETINOPATHY: NEGATIVE
RIGHT EYE DM RETINOPATHY: NEGATIVE

## 2025-04-07 ENCOUNTER — TELEPHONE (OUTPATIENT)
Dept: DIABETES | Facility: CLINIC | Age: 65
End: 2025-04-07
Payer: COMMERCIAL

## 2025-04-07 NOTE — TELEPHONE ENCOUNTER
----- Message from Bri sent at 4/7/2025  2:44 PM CDT -----  Type:  Needs Medical AdviceWho Called: DAVID WOOD [[id number]]Symptoms (please be specific):  How long has patient had these symptoms:  Pharmacy name and phone #:  Would the patient rather a call back or a response via [facility]? Best Call Back Number:  [phone] Information: Patient will like to schedule appt . Patient is out of work and needs clearance to return

## 2025-04-07 NOTE — TELEPHONE ENCOUNTER
Returned call, pt requesting appt to discuss removing insulin from regimen due to job not allowing him to take insulin while working. Scheduled pt virtual for 04/08-Le

## 2025-04-08 ENCOUNTER — OFFICE VISIT (OUTPATIENT)
Dept: DIABETES | Facility: CLINIC | Age: 65
End: 2025-04-08
Payer: COMMERCIAL

## 2025-04-08 DIAGNOSIS — E11.65 TYPE 2 DIABETES MELLITUS WITH HYPERGLYCEMIA, WITH LONG-TERM CURRENT USE OF INSULIN: Primary | ICD-10-CM

## 2025-04-08 DIAGNOSIS — Z79.4 TYPE 2 DIABETES MELLITUS WITH HYPERGLYCEMIA, WITH LONG-TERM CURRENT USE OF INSULIN: Primary | ICD-10-CM

## 2025-04-08 NOTE — PROGRESS NOTES
The patient location is: [place]  The chief complaint leading to consultation is: diabetes    Visit type: audiovisual    Face to Face time with patient: 10 minutes  21 minutes of total time spent on the encounter, which includes face to face time and non-face to face time preparing to see the patient (eg, review of tests), Obtaining and/or reviewing separately obtained history, Documenting clinical information in the electronic or other health record, Independently interpreting results (not separately reported) and communicating results to the patient/family/caregiver, or Care coordination (not separately reported).         Each patient to whom he or she provides medical services by telemedicine is:  (1) informed of the relationship between the physician and patient and the respective role of any other health care provider with respect to management of the patient; and (2) notified that he or she may decline to receive medical services by telemedicine and may withdraw from such care at any time.    Notes:     Patient ID: Mikey Stinson is a 64 y.o. male.  Patient's current PCP is Tila Chapa MD.     Chief Complaint: Diabetes Mellitus    HPI  Mikey Stinson is a 64 y.o. White male presenting for a follow up for diabetes.     Patient has been diagnosed with type 2 diabetes for < 5 years.  History of diabetes related hospitalizations:None  Pertinent to decision making is/are the following comorbidities:HLD, HTN. Alcohol abuse  Complications related to diabetes: none  Previous diabetes education:yes with DX    Current diet: Trying to eat better. Still overeating carbs at some meals with resultant hyperglycemia. Decreased to 5 beers nightly.Working with RD  Activity Level: Work related - active at home  Occupation:/heavy equipment, . 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 Wants to discuss stopping Insulin    Personal history of pancreatitis: denies  Family history of pancreatic cancer in first-degree relative: denies  Family history of MTC/MEN/endocrine tumors: denies     CURRENT DM MEDICATIONS:   Diabetes Medications               metFORMIN (GLUCOPHAGE) 1000 MG tablet Take 1 tablet (1,000 mg total) by mouth 2 (two) times daily with meals.    glipiZIDE 5 MG TR24 Take 1 tablet (5 mg total) by mouth 2 (two) times a day.    insulin glargine U-100, Lantus, (LANTUS SOLOSTAR U-100 INSULIN) 100 unit/mL (3 mL) InPn pen Inject up to 22 units daily       Past failed treatment(s) include:   Glipizide - hypoglycemia  Actos  Janumet      Meter/cgm: meter  Blood glucose testing is performed regularly.   Patient is testing 3 times per week.  Any episodes of hypoglycemia? None known  Glucose trends: 125-160 per report      His blood sugar in the clinic today was: NA  Lab Results   Component Value Date    POCGLU 142 (A) 01/31/2025       Diabetes Management Status    Statin: Taking  ACE/ARB: Taking    Screening or Prevention Patient's value Goal Complete/Controlled?   HgA1C Testing and Control   Lab Results   Component Value Date    HGBA1C 6.8 (H) 01/31/2025      Annually/Less than 8% yes   Lipid profile : 10/25/2024 Annually Yes   LDL control Lab Results   Component Value Date    LDLCALC 62.2 (L) 10/25/2024    Annually/Less than 100 mg/dl  yes   Nephropathy screening Lab Results   Component Value Date    LABMICR 55.0 04/19/2024     No results found for: "PROTEINUA"  No results found for: "UTPCR"   Annually Yes   Blood pressure BP Readings from Last 1 Encounters:   01/31/25 135/89    Less than 140/90 Yes   Dilated retinal exam : 11/15/2019 due Annually No   Foot exam   7/19/24 Annually YES     Preferred lab site:none  Preferred visit site:none    Wt Readings from Last 3 " "Encounters:   01/31/25 1527 89.5 kg (197 lb 5 oz)   11/29/24 0823 86.8 kg (191 lb 5.8 oz)   11/22/24 0941 84.4 kg (185 lb 15.3 oz)     Labs reviewed and are noted below.    Lab Results   Component Value Date    HGBA1C 6.8 (H) 01/31/2025    HGBA1C 8.1 (H) 10/25/2024    HGBA1C 7.7 (H) 07/19/2024     Lab Results   Component Value Date    WBC 4.46 04/19/2024    HGB 16.9 04/19/2024    HCT 50.8 04/19/2024     04/19/2024    CHOL 133 10/25/2024    TRIG 174 (H) 10/25/2024    HDL 36 (L) 10/25/2024    LDLCALC 62.2 (L) 10/25/2024    ALT 28 10/25/2024    AST 23 10/25/2024     10/25/2024    K 4.0 10/25/2024     10/25/2024    ANIONGAP 12 10/25/2024    CREATININE 0.9 10/25/2024    ESTGFRAFRICA >60.0 11/15/2019    EGFRNONAA >60.0 11/15/2019    BUN 17 10/25/2024    CO2 24 10/25/2024    TSH 0.971 04/19/2024    PSA 1.3 04/19/2024     (H) 10/25/2024    MICROALBUR 55.0 05/25/2018     Lab Results   Component Value Date    TSH 0.971 04/19/2024    CALCIUM 10.2 10/25/2024     No results found for: "CPEPTIDE"  No results found for: "GLUTAMICACID"  Glucose   Date Value Ref Range Status   10/25/2024 227 (H) 70 - 110 mg/dL Final     Anion Gap   Date Value Ref Range Status   10/25/2024 12 8 - 16 mmol/L Final     eGFR if    Date Value Ref Range Status   11/15/2019 >60.0 >60 mL/min/1.73 m^2 Final     eGFR if non    Date Value Ref Range Status   11/15/2019 >60.0 >60 mL/min/1.73 m^2 Final     Comment:     Calculation used to obtain the estimated glomerular filtration  rate (eGFR) is the CKD-EPI equation.              Review of patient's allergies indicates:   Allergen Reactions    Codeine      Social History     Socioeconomic History    Marital status: Legally    Tobacco Use    Smoking status: Never    Smokeless tobacco: Never   Substance and Sexual Activity    Alcohol use: Yes     Alcohol/week: 20.0 standard drinks of alcohol     Types: 20 Cans of beer per week     Past Medical " History:   Diagnosis Date    Hyperlipidemia     Hypertension        Review of Systems   Constitutional:  Negative for malaise/fatigue and weight loss.   Eyes:  Negative for blurred vision and double vision.   Respiratory:  Negative for shortness of breath.    Cardiovascular: Negative.    Gastrointestinal: Negative.    Genitourinary:  Negative for frequency.   Musculoskeletal:  Negative for myalgias.   Neurological: Negative.    Psychiatric/Behavioral: Negative.           Physical Exam  Constitutional:       General: He is not in acute distress.     Appearance: Normal appearance.   Eyes:      Conjunctiva/sclera: Conjunctivae normal.   Neck:      Thyroid: No thyroid mass, thyromegaly or thyroid tenderness.   Pulmonary:      Effort: Pulmonary effort is normal.   Skin:     Coloration: Skin is not pale.   Neurological:      General: No focal deficit present.      Mental Status: He is alert and oriented to person, place, and time.      Comments:      Psychiatric:         Mood and Affect: Mood normal.         Behavior: Behavior normal.             Assessment & Plan    Type 2 diabetes mellitus with hyperglycemia, with long-term current use of insulin  -     empagliflozin (JARDIANCE) 25 mg tablet; Take 1 tablet (25 mg total) by mouth once daily.  Dispense: 30 tablet; Refill: 2    - Ok to hold Tresiba for now  - Start Jardiance 25 mg in am (prior hx of use with good results. Stopped due to cost)  - Continue Metformin and glipizide  - Reinforced diet  - Reinforced proper hydration/water intake  - Call if bs < 80 or > 200 consistently      - Reviewed with patient:  The basic pathophysiology of Type 2 diabetes  Mechanism of action and action time of medications  Use of home glucose monitor/cgm  Basic diet/carbohydrate counting/avoiding simple sugars/plate method  Proper hydration   Risk of complications and preventive measures  When to call for assistance      - Follow up: 1 month    Visit today included increased complexity  associated with the care of the episodic problem hyperglycemia addressed and managing the longitudinal care of the patient due to the serious and/or complex managed problem(s) Type 2 diabetes.

## 2025-04-08 NOTE — LETTER
April 8, 2025        Mikey Stinson  [address] [place] [address]             [facility] [address]  [address] [facility] [place] [address]  Phone: [phone]  Fax: [phone]   Patient: Mikey Stinson   MR Number: [id number]   YOB: 1960   Date of Visit: 4/8/2025     To whom it may concern,    Mikey Stinson is under my care for his Type 2 diabetes mellitus. I placed him on low dose basal insulin due to him being unable to afford the high cost of other medications. However, today I stopped his insulin and have placed him on Jardiance. He has had good success with this medication in the past. I will continue to follow his glucose readings to ensure optimal glucose control off of the insulin. If you have any further questions, please notify my office.    Sincerely,      Le Ghosh, NP            CC  No Recipients    Enclosure

## 2025-05-02 ENCOUNTER — LAB VISIT (OUTPATIENT)
Dept: LAB | Facility: HOSPITAL | Age: 65
End: 2025-05-02
Attending: NURSE PRACTITIONER
Payer: COMMERCIAL

## 2025-05-02 ENCOUNTER — OFFICE VISIT (OUTPATIENT)
Dept: DIABETES | Facility: CLINIC | Age: 65
End: 2025-05-02
Payer: COMMERCIAL

## 2025-05-02 VITALS
DIASTOLIC BLOOD PRESSURE: 77 MMHG | BODY MASS INDEX: 28.52 KG/M2 | WEIGHT: 193.13 LBS | HEART RATE: 106 BPM | SYSTOLIC BLOOD PRESSURE: 123 MMHG

## 2025-05-02 DIAGNOSIS — E11.65 TYPE 2 DIABETES MELLITUS WITH HYPERGLYCEMIA, WITH LONG-TERM CURRENT USE OF INSULIN: Primary | ICD-10-CM

## 2025-05-02 DIAGNOSIS — E11.59 HYPERTENSION ASSOCIATED WITH DIABETES: ICD-10-CM

## 2025-05-02 DIAGNOSIS — E11.65 TYPE 2 DIABETES MELLITUS WITH HYPERGLYCEMIA, WITH LONG-TERM CURRENT USE OF INSULIN: ICD-10-CM

## 2025-05-02 DIAGNOSIS — Z79.4 TYPE 2 DIABETES MELLITUS WITH HYPERGLYCEMIA, WITH LONG-TERM CURRENT USE OF INSULIN: ICD-10-CM

## 2025-05-02 DIAGNOSIS — Z79.4 TYPE 2 DIABETES MELLITUS WITH HYPERGLYCEMIA, WITH LONG-TERM CURRENT USE OF INSULIN: Primary | ICD-10-CM

## 2025-05-02 DIAGNOSIS — E78.5 HYPERLIPIDEMIA ASSOCIATED WITH TYPE 2 DIABETES MELLITUS: ICD-10-CM

## 2025-05-02 DIAGNOSIS — I15.2 HYPERTENSION ASSOCIATED WITH DIABETES: ICD-10-CM

## 2025-05-02 DIAGNOSIS — E11.69 HYPERLIPIDEMIA ASSOCIATED WITH TYPE 2 DIABETES MELLITUS: ICD-10-CM

## 2025-05-02 LAB
ALBUMIN/CREAT UR: 10.2 UG/MG
ANION GAP (OHS): 9 MMOL/L (ref 8–16)
BUN SERPL-MCNC: 19 MG/DL (ref 8–23)
CALCIUM SERPL-MCNC: 10.4 MG/DL (ref 8.7–10.5)
CHLORIDE SERPL-SCNC: 105 MMOL/L (ref 95–110)
CO2 SERPL-SCNC: 28 MMOL/L (ref 23–29)
CREAT SERPL-MCNC: 0.8 MG/DL (ref 0.5–1.4)
CREAT UR-MCNC: 59 MG/DL (ref 23–375)
EAG (OHS): 151 MG/DL (ref 68–131)
GFR SERPLBLD CREATININE-BSD FMLA CKD-EPI: >60 ML/MIN/1.73/M2
GLUCOSE SERPL-MCNC: 157 MG/DL (ref 70–110)
GLUCOSE SERPL-MCNC: 98 MG/DL (ref 70–110)
HBA1C MFR BLD: 6.9 % (ref 4–5.6)
MICROALBUMIN UR-MCNC: 6 UG/ML (ref ?–5000)
POTASSIUM SERPL-SCNC: 4.3 MMOL/L (ref 3.5–5.1)
SODIUM SERPL-SCNC: 142 MMOL/L (ref 136–145)

## 2025-05-02 PROCEDURE — 82570 ASSAY OF URINE CREATININE: CPT

## 2025-05-02 PROCEDURE — 83036 HEMOGLOBIN GLYCOSYLATED A1C: CPT

## 2025-05-02 PROCEDURE — 99999 PR PBB SHADOW E&M-EST. PATIENT-LVL III: CPT | Mod: PBBFAC,,, | Performed by: NURSE PRACTITIONER

## 2025-05-02 PROCEDURE — 80048 BASIC METABOLIC PNL TOTAL CA: CPT

## 2025-05-02 PROCEDURE — 36415 COLL VENOUS BLD VENIPUNCTURE: CPT

## 2025-05-02 RX ORDER — GLIPIZIDE 5 MG/1
5 TABLET, FILM COATED, EXTENDED RELEASE ORAL 2 TIMES DAILY
Qty: 180 TABLET | Refills: 1 | Status: SHIPPED | OUTPATIENT
Start: 2025-05-02 | End: 2026-05-02

## 2025-05-02 RX ORDER — METFORMIN HYDROCHLORIDE 1000 MG/1
1000 TABLET ORAL 2 TIMES DAILY WITH MEALS
Qty: 180 TABLET | Refills: 1 | Status: SHIPPED | OUTPATIENT
Start: 2025-05-02

## 2025-05-02 RX ORDER — OLMESARTAN MEDOXOMIL 20 MG/1
20 TABLET ORAL DAILY
Qty: 90 TABLET | Refills: 1 | Status: SHIPPED | OUTPATIENT
Start: 2025-05-02

## 2025-05-02 RX ORDER — ROSUVASTATIN CALCIUM 20 MG/1
20 TABLET, COATED ORAL NIGHTLY
Qty: 90 TABLET | Refills: 1 | Status: SHIPPED | OUTPATIENT
Start: 2025-05-02

## 2025-05-02 NOTE — PROGRESS NOTES
Patient ID: Mikey Stinson is a 64 y.o. male.  Patient's current PCP is Tila Chapa MD.     Chief Complaint: Diabetes Mellitus (EST PT)    HPI  Mikey Stinson is a 64 y.o. White male presenting for a follow up for diabetes.     Patient has been diagnosed with type 2 diabetes for < 5 years.  History of diabetes related hospitalizations:None  Pertinent to decision making is/are the following comorbidities:HLD, HTN. Alcohol abuse  Complications related to diabetes: none  Previous diabetes education:yes with DX    Current diet: Trying to eat better. Occasional indiscretion. Decreased to 5 beers nightly.   Current weight:193  Weight at LOV: 197 ON 1/31/25  Weight at FOV: 187 ON 7/19/24  Activity Level: Work related - active at home  Occupation:/heavy equipment, . Working previously in [employer]    Changes made at last visit:  - Ok to hold Tresiba for now  - Start Jardiance 25 mg in am (prior hx of use with good results. 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 No  complaints    Personal history of pancreatitis: denies  Family history of pancreatic cancer in first-degree relative: denies  Family history of MTC/MEN/endocrine tumors: denies     CURRENT DM MEDICATIONS:   Diabetes Medications              empagliflozin (JARDIANCE) 25 mg tablet Take 1 tablet (25 mg total) by mouth once daily.    glipiZIDE 5 MG TR24 Take 1 tablet (5 mg total) by mouth 2 (two) times a day.    metFORMIN (GLUCOPHAGE) 1000 MG tablet Take 1 tablet (1,000 mg total) by mouth 2 (two) times daily with meals.     Past failed treatment(s) include:   Glipizide - hypoglycemia  Actos  Janumet    Meter/cgm: meter  Blood glucose testing is performed regularly.   Patient is testing 3 times per week.  Any episodes of  "hypoglycemia? None known  Glucose trends: 120-160 per report in am      His blood sugar in the clinic today was: NA  Lab Results   Component Value Date    POCGLU 157 (A) 05/02/2025       Diabetes Management Status    Statin: Taking  ACE/ARB: Taking    Screening or Prevention Patient's value Goal Complete/Controlled?   HgA1C Testing and Control   Lab Results   Component Value Date    HGBA1C 6.8 (H) 01/31/2025      Annually/Less than 8% yes   Lipid profile : 10/25/2024 Annually Yes   LDL control Lab Results   Component Value Date    LDLCALC 62.2 (L) 10/25/2024    Annually/Less than 100 mg/dl  yes   Nephropathy screening Lab Results   Component Value Date    LABMICR 55.0 04/19/2024     No results found for: "PROTEINUA"  No results found for: "UTPCR"   Annually Yes   Blood pressure BP Readings from Last 1 Encounters:   05/02/25 123/77    Less than 140/90 Yes   Dilated retinal exam [facility] office Annually yes   Foot exam   7/19/24 Annually YES     Preferred lab site:none  Preferred visit site:none    Wt Readings from Last 3 Encounters:   05/02/25 1525 87.6 kg (193 lb 2 oz)   01/31/25 1527 89.5 kg (197 lb 5 oz)   11/29/24 0823 86.8 kg (191 lb 5.8 oz)     Labs reviewed and are noted below.    Lab Results   Component Value Date    HGBA1C 6.8 (H) 01/31/2025    HGBA1C 8.1 (H) 10/25/2024    HGBA1C 7.7 (H) 07/19/2024     Lab Results   Component Value Date    WBC 4.46 04/19/2024    HGB 16.9 04/19/2024    HCT 50.8 04/19/2024     04/19/2024    CHOL 133 10/25/2024    TRIG 174 (H) 10/25/2024    HDL 36 (L) 10/25/2024    LDLCALC 62.2 (L) 10/25/2024    ALT 28 10/25/2024    AST 23 10/25/2024     10/25/2024    K 4.0 10/25/2024     10/25/2024    ANIONGAP 12 10/25/2024    CREATININE 0.9 10/25/2024    ESTGFRAFRICA >60.0 11/15/2019    EGFRNONAA >60.0 11/15/2019    BUN 17 10/25/2024    CO2 24 10/25/2024    TSH 0.971 04/19/2024    PSA 1.3 04/19/2024     (H) 10/25/2024    MICROALBUR 55.0 05/25/2018 " "    Lab Results   Component Value Date    TSH 0.971 04/19/2024    CALCIUM 10.2 10/25/2024     No results found for: "CPEPTIDE"  No results found for: "GLUTAMICACID"  Glucose   Date Value Ref Range Status   10/25/2024 227 (H) 70 - 110 mg/dL Final     Anion Gap   Date Value Ref Range Status   10/25/2024 12 8 - 16 mmol/L Final     eGFR if    Date Value Ref Range Status   11/15/2019 >60.0 >60 mL/min/1.73 m^2 Final     eGFR if non    Date Value Ref Range Status   11/15/2019 >60.0 >60 mL/min/1.73 m^2 Final     Comment:     Calculation used to obtain the estimated glomerular filtration  rate (eGFR) is the CKD-EPI equation.              Review of patient's allergies indicates:   Allergen Reactions    Codeine      Social History     Socioeconomic History    Marital status: Legally    Tobacco Use    Smoking status: Never    Smokeless tobacco: Never   Substance and Sexual Activity    Alcohol use: Yes     Alcohol/week: 20.0 standard drinks of alcohol     Types: 20 Cans of beer per week     Past Medical History:   Diagnosis Date    Hyperlipidemia     Hypertension        Review of Systems   Constitutional:  Negative for malaise/fatigue and weight loss.   Eyes:  Negative for blurred vision and double vision.   Respiratory:  Negative for shortness of breath.    Cardiovascular: Negative.    Gastrointestinal: Negative.    Genitourinary:  Negative for frequency.   Musculoskeletal:  Negative for myalgias.   Neurological: Negative.    Psychiatric/Behavioral: Negative.           Physical Exam  Vitals reviewed.   Constitutional:       General: He is not in acute distress.     Appearance: Normal appearance. He is normal weight.   Eyes:      Conjunctiva/sclera: Conjunctivae normal.      Pupils: Pupils are equal, round, and reactive to light.   Neck:      Thyroid: No thyroid mass, thyromegaly or thyroid tenderness.   Cardiovascular:      Rate and Rhythm: Normal rate and regular rhythm.      Pulses: " Normal pulses.      Heart sounds: Normal heart sounds. No murmur heard.  Pulmonary:      Effort: Pulmonary effort is normal.      Breath sounds: Normal breath sounds.   Abdominal:      General: Bowel sounds are normal.      Palpations: Abdomen is soft.   Musculoskeletal:      Right lower leg: No edema.      Left lower leg: No edema.   Skin:     General: Skin is warm and dry.      Comments: Sites without hypertrophy and/or signs of infection   Neurological:      General: No focal deficit present.      Mental Status: He is alert and oriented to person, place, and time.      Comments:      Psychiatric:         Mood and Affect: Mood normal.         Behavior: Behavior normal.             Assessment & Plan    Type 2 diabetes mellitus with hyperglycemia, with long-term current use of insulin  -     POCT Glucose, Hand-Held Device  -     Hemoglobin A1C; Future; Expected date: 05/02/2025  -     Basic Metabolic Panel; Future; Expected date: 05/02/2025  -     Microalbumin/Creatinine Ratio, Urine; Future; Expected date: 05/02/2025  -     metFORMIN (GLUCOPHAGE) 1000 MG tablet; Take 1 tablet (1,000 mg total) by mouth 2 (two) times daily with meals.  Dispense: 180 tablet; Refill: 1  -     glipiZIDE 5 MG TR24; Take 1 tablet (5 mg total) by mouth 2 (two) times a day.  Dispense: 180 tablet; Refill: 1  -     Continue same medication for now  - Reinforced diet and exercise  - Call if bs < 80 or > 180 consistently  - ROXANE signed for eye exam    Hyperlipidemia associated with type 2 diabetes mellitus  -     rosuvastatin (CRESTOR) 20 MG tablet; Take 1 tablet (20 mg total) by mouth every evening.  Dispense: 90 tablet; Refill: 1    Hypertension associated with diabetes - at goal  -     Basic Metabolic Panel; Future; Expected date: 05/02/2025  -     olmesartan (BENICAR) 20 MG tablet; Take 1 tablet (20 mg total) by mouth once daily.  Dispense: 90 tablet; Refill: 1    Other orders  -     empagliflozin (JARDIANCE) 25 mg tablet; Take 1 tablet (25  mg total) by mouth once daily.  Dispense: 30 tablet; Refill: 1            - Reviewed with patient:  The basic pathophysiology of Type 2 diabetes  Mechanism of action and action time of medications  Use of home glucose monitor/cgm  Basic diet/carbohydrate counting/avoiding simple sugars/plate method  Proper hydration   Risk of complications and preventive measures  When to call for assistance      - Follow up: 3 months    Visit today included increased complexity associated with the care of the episodic problem hyperglycemia addressed and managing the longitudinal care of the patient due to the serious and/or complex managed problem(s) Type 2 diabetes.

## 2025-05-06 ENCOUNTER — RESULTS FOLLOW-UP (OUTPATIENT)
Dept: DIABETES | Facility: CLINIC | Age: 65
End: 2025-05-06

## 2025-05-30 ENCOUNTER — OFFICE VISIT (OUTPATIENT)
Dept: INTERNAL MEDICINE | Facility: CLINIC | Age: 65
End: 2025-05-30
Payer: COMMERCIAL

## 2025-05-30 VITALS
HEIGHT: 69 IN | TEMPERATURE: 97 F | BODY MASS INDEX: 28.35 KG/M2 | DIASTOLIC BLOOD PRESSURE: 70 MMHG | OXYGEN SATURATION: 97 % | SYSTOLIC BLOOD PRESSURE: 126 MMHG | HEART RATE: 63 BPM | WEIGHT: 191.38 LBS

## 2025-05-30 DIAGNOSIS — I10 HYPERTENSION, UNSPECIFIED TYPE: ICD-10-CM

## 2025-05-30 DIAGNOSIS — F10.10 ALCOHOL ABUSE: ICD-10-CM

## 2025-05-30 DIAGNOSIS — Z23 IMMUNIZATION DUE: ICD-10-CM

## 2025-05-30 DIAGNOSIS — E11.9 TYPE 2 DIABETES MELLITUS WITHOUT RETINOPATHY: Primary | ICD-10-CM

## 2025-05-30 DIAGNOSIS — Z12.5 SCREENING FOR PROSTATE CANCER: ICD-10-CM

## 2025-05-30 DIAGNOSIS — E11.69 HYPERLIPIDEMIA ASSOCIATED WITH TYPE 2 DIABETES MELLITUS: ICD-10-CM

## 2025-05-30 DIAGNOSIS — N52.9 ERECTILE DYSFUNCTION, UNSPECIFIED ERECTILE DYSFUNCTION TYPE: ICD-10-CM

## 2025-05-30 DIAGNOSIS — Z79.899 ENCOUNTER FOR LONG-TERM (CURRENT) USE OF MEDICATIONS: ICD-10-CM

## 2025-05-30 DIAGNOSIS — E78.5 HYPERLIPIDEMIA ASSOCIATED WITH TYPE 2 DIABETES MELLITUS: ICD-10-CM

## 2025-05-30 PROCEDURE — 99999 PR PBB SHADOW E&M-EST. PATIENT-LVL III: CPT | Mod: PBBFAC,,, | Performed by: FAMILY MEDICINE

## 2025-05-30 NOTE — PROGRESS NOTES
Subjective:       Patient ID: Mikey Stinson is a 64 y.o. male.    Chief Complaint: Establish Care    HPI    Problem List[1]    Past Medical History:   Diagnosis Date    Diabetes mellitus, type 2     Hyperlipidemia     Hypertension        Past Surgical History:   Procedure Laterality Date    CHEST EXPLORATION      COLONOSCOPY, SCREENING, LOW RISK PATIENT N/A 11/22/2024    Procedure: COLONOSCOPY, SCREENING, LOW RISK PATIENT;  Surgeon: Dante Alas MD;  Location: [facility];  Service: Endoscopy;  Laterality: N/A;    HAND SURGERY      KNEE ARTHROSCOPY         Family History   Problem Relation Name Age of Onset    Diabetes Mother      Lung cancer Mother      Thyroid cancer Neg Hx         Tobacco Use History[2]    Wt Readings from Last 5 Encounters:   05/30/25 86.8 kg (191 lb 5.8 oz)   05/02/25 87.6 kg (193 lb 2 oz)   01/31/25 89.5 kg (197 lb 5 oz)   11/29/24 86.8 kg (191 lb 5.8 oz)   11/22/24 84.4 kg (185 lb 15.3 oz)       For further HPI details, see assessment and plan.    Review of Systems  no acute complaints.    Objective:      Vitals:    05/30/25 0840   BP: 126/70   Pulse: 63   Temp: 97.4 °F (36.3 °C)     Protective Sensation (w/ 10 gram monofilament):  Right: Intact  Left: Intact    Visual Inspection:  Normal -  Bilateral    Pedal Pulses:   Right: Present  Left: Present    Posterior Tibialis Pulses:   Right:Present  Left: Present      Physical Exam  Constitutional:       General: He is not in acute distress.     Appearance: He is not ill-appearing.   Pulmonary:      Effort: Pulmonary effort is normal. No respiratory distress.   Neurological:      General: No focal deficit present.      Mental Status: He is alert.   Psychiatric:         Mood and Affect: Mood normal.         Behavior: Behavior normal.         Assessment:       1. Type 2 diabetes mellitus without retinopathy    2. Hyperlipidemia associated with type 2 diabetes mellitus    3. Alcohol abuse    4. Hypertension, unspecified type    5. Erectile  dysfunction, unspecified erectile dysfunction type    6. Immunization due    7. Screening for prostate cancer    8. Encounter for long-term (current) use of medications        Plan:   Type 2 diabetes mellitus without retinopathy  Lab Results   Component Value Date    HGBA1C 6.9 (H) 05/02/2025   Measures are well controlled.  He is on Jardiance 25 mg, glipizide 5 mg, metformin 1000 mg twice daily.    Discussed risk of Jardiance with polyuria/glucosuria.  Monitor for any urinary issues.  Fortunately he is having none.  Discussed risk of glipizide causing hypoglycemia.  Current med.  Sugar stay controlled I will eventually move labrum glipizide.  Continue metformin as he is tolerating medication renal functions fine.  Patient is due for his annual diabetic eye exam.  He goes to [facility] [place].  I do ask he gets me those records.  He is due for a number of immunizations but at present time he would like to defer such.  If you ever changes his mind he is welcome to come back and we will provide    Hyperlipidemia associated with type 2 diabetes mellitus  Controlled.  Continue Crestor 20 mg.  LDL at goal.    Alcohol abuse  Discussed alcohol consumption.  His alcohol consumption ranges.  Variable.  Strongly encourage he keep it less than 2 beers in a given day and I would encourage avoiding daily drinking.    Hypertension, unspecified type  Blood pressure is well controlled with the utilization of olmesartan 20 mg.  Renal function is fine.  Continue medication    Erectile dysfunction, unspecified erectile dysfunction type  Cialis is used on an as needed basis tolerates and benefits.  Continue medication    Immunization due  We will provide when he is ready    Screening for prostate cancer  Would like to keep up-to-date on prostate cancer screening with PSA.  Will include with next round of blood testing    The 10-year ASCVD risk score (Geoff BUCIO, et al., 2019) is: 20.3%    Values used to calculate the score:       Age: 64 years      Sex: Male      Is Non- : No      Diabetic: Yes      Tobacco smoker: No      Systolic Blood Pressure: 126 mmHg      Is BP treated: Yes      HDL Cholesterol: 36 mg/dL      Total Cholesterol: 133 mg/dL  Elevated ASCVD risk score with numerous cardiovascular symptoms.  Fortunately patient is without any chest pain or trouble breathing or concerning symptoms.  Did discuss potentially arranging for cardiovascular consultation to discuss potential stress testing.  At present time he wants to defer this but eventually I will push him in that direction if he is amenable      Lab in 6 mo  See me after.    This note was verbally dictated, please excuse any type errors.         [1]   Patient Active Problem List  Diagnosis    Type 2 diabetes mellitus with hyperglycemia, with long-term current use of insulin    Hypertension associated with diabetes    Hyperlipidemia associated with type 2 diabetes mellitus    Alcohol abuse    Colon cancer screening    Erectile dysfunction   [2]   Social History  Tobacco Use   Smoking Status Never   Smokeless Tobacco Never

## 2025-06-11 ENCOUNTER — PATIENT OUTREACH (OUTPATIENT)
Dept: ADMINISTRATIVE | Facility: HOSPITAL | Age: 65
End: 2025-06-11
Payer: COMMERCIAL

## 2025-06-11 NOTE — PROGRESS NOTES
Received signed ROXANE via fax in box.  Uploaded to media  ROXANE e faxed for eye exam report. Signed Authorization attached.  Reminder set

## 2025-06-11 NOTE — LETTER
AUTHORIZATION FOR RELEASE OF   CONFIDENTIAL INFORMATION      We are seeing Mikey Stinson, date of birth 1960, in the clinic at [facility]. Yan Adair MD is the patient's PCP. Mikey Stinson has an outstanding lab/procedure at the time we reviewed his chart. In order to help keep his health information updated, he has authorized us to request the following medical record(s):                                         (  x)  EYE EXAM                 Please fax records to [facility], Durel, Timothy M., MD,  at [phone] or email to [email or web address].            Patient Name: Mikey Stinson  : 1960  Patient Phone #: [phone]

## 2025-08-12 DIAGNOSIS — E11.59 HYPERTENSION ASSOCIATED WITH DIABETES: ICD-10-CM

## 2025-08-12 DIAGNOSIS — I15.2 HYPERTENSION ASSOCIATED WITH DIABETES: ICD-10-CM

## 2025-08-13 ENCOUNTER — TELEPHONE (OUTPATIENT)
Dept: DIABETES | Facility: CLINIC | Age: 65
End: 2025-08-13
Payer: COMMERCIAL

## 2025-08-13 DIAGNOSIS — Z79.4 TYPE 2 DIABETES MELLITUS WITH HYPERGLYCEMIA, WITH LONG-TERM CURRENT USE OF INSULIN: Primary | ICD-10-CM

## 2025-08-13 DIAGNOSIS — E11.65 TYPE 2 DIABETES MELLITUS WITH HYPERGLYCEMIA, WITH LONG-TERM CURRENT USE OF INSULIN: Primary | ICD-10-CM

## 2025-08-13 RX ORDER — OLMESARTAN MEDOXOMIL 20 MG/1
20 TABLET ORAL DAILY
Qty: 90 TABLET | Refills: 1 | Status: SHIPPED | OUTPATIENT
Start: 2025-08-13

## 2025-08-13 RX ORDER — DAPAGLIFLOZIN 10 MG/1
10 TABLET, FILM COATED ORAL DAILY
Qty: 90 TABLET | Refills: 3 | Status: SHIPPED | OUTPATIENT
Start: 2025-08-13

## 2025-08-15 ENCOUNTER — OFFICE VISIT (OUTPATIENT)
Dept: DIABETES | Facility: CLINIC | Age: 65
End: 2025-08-15
Payer: COMMERCIAL

## 2025-08-15 ENCOUNTER — LAB VISIT (OUTPATIENT)
Dept: LAB | Facility: HOSPITAL | Age: 65
End: 2025-08-15
Attending: NURSE PRACTITIONER
Payer: COMMERCIAL

## 2025-08-15 VITALS
WEIGHT: 194.69 LBS | DIASTOLIC BLOOD PRESSURE: 79 MMHG | HEART RATE: 66 BPM | BODY MASS INDEX: 28.75 KG/M2 | SYSTOLIC BLOOD PRESSURE: 132 MMHG

## 2025-08-15 DIAGNOSIS — E11.65 TYPE 2 DIABETES MELLITUS WITH HYPERGLYCEMIA, WITH LONG-TERM CURRENT USE OF INSULIN: Primary | ICD-10-CM

## 2025-08-15 DIAGNOSIS — Z79.4 TYPE 2 DIABETES MELLITUS WITH HYPERGLYCEMIA, WITH LONG-TERM CURRENT USE OF INSULIN: ICD-10-CM

## 2025-08-15 DIAGNOSIS — E11.69 HYPERLIPIDEMIA ASSOCIATED WITH TYPE 2 DIABETES MELLITUS: ICD-10-CM

## 2025-08-15 DIAGNOSIS — Z79.4 TYPE 2 DIABETES MELLITUS WITH HYPERGLYCEMIA, WITH LONG-TERM CURRENT USE OF INSULIN: Primary | ICD-10-CM

## 2025-08-15 DIAGNOSIS — E78.5 HYPERLIPIDEMIA ASSOCIATED WITH TYPE 2 DIABETES MELLITUS: ICD-10-CM

## 2025-08-15 DIAGNOSIS — E11.59 HYPERTENSION ASSOCIATED WITH DIABETES: ICD-10-CM

## 2025-08-15 DIAGNOSIS — E11.65 TYPE 2 DIABETES MELLITUS WITH HYPERGLYCEMIA, WITH LONG-TERM CURRENT USE OF INSULIN: ICD-10-CM

## 2025-08-15 DIAGNOSIS — I15.2 HYPERTENSION ASSOCIATED WITH DIABETES: ICD-10-CM

## 2025-08-15 LAB
EAG (OHS): 146 MG/DL (ref 68–131)
GLUCOSE SERPL-MCNC: 114 MG/DL (ref 70–110)
HBA1C MFR BLD: 6.7 % (ref 4–5.6)

## 2025-08-15 PROCEDURE — 83036 HEMOGLOBIN GLYCOSYLATED A1C: CPT

## 2025-08-15 PROCEDURE — 36415 COLL VENOUS BLD VENIPUNCTURE: CPT

## 2025-08-15 PROCEDURE — 99999 PR PBB SHADOW E&M-EST. PATIENT-LVL III: CPT | Mod: PBBFAC,,, | Performed by: NURSE PRACTITIONER
